# Patient Record
Sex: FEMALE | Race: WHITE | Employment: OTHER | URBAN - NONMETROPOLITAN AREA
[De-identification: names, ages, dates, MRNs, and addresses within clinical notes are randomized per-mention and may not be internally consistent; named-entity substitution may affect disease eponyms.]

---

## 2018-09-04 PROBLEM — N39.0 UTI (URINARY TRACT INFECTION): Status: ACTIVE | Noted: 2018-09-04

## 2018-09-05 ENCOUNTER — HOSPITAL ENCOUNTER (INPATIENT)
Age: 73
LOS: 5 days | Discharge: HOME HEALTH | DRG: 758 | End: 2018-09-11
Attending: HOSPITALIST | Admitting: HOSPITALIST
Payer: MEDICARE

## 2018-09-05 PROBLEM — Z85.038 HISTORY OF COLON CANCER: Status: ACTIVE | Noted: 2018-09-05

## 2018-09-05 PROBLEM — E78.49 OTHER HYPERLIPIDEMIA: Status: ACTIVE | Noted: 2018-09-05

## 2018-09-05 PROBLEM — E03.9 ACQUIRED HYPOTHYROIDISM: Status: ACTIVE | Noted: 2018-09-05

## 2018-09-05 LAB
ALBUMIN SERPL-MCNC: 3.4 G/DL (ref 3.5–5.1)
ALP BLD-CCNC: 97 U/L (ref 38–126)
ALT SERPL-CCNC: 9 U/L (ref 11–66)
ANION GAP SERPL CALCULATED.3IONS-SCNC: 16 MEQ/L (ref 8–16)
AST SERPL-CCNC: 10 U/L (ref 5–40)
BILIRUB SERPL-MCNC: < 0.2 MG/DL (ref 0.3–1.2)
BUN BLDV-MCNC: 19 MG/DL (ref 7–22)
CALCIUM SERPL-MCNC: 9.1 MG/DL (ref 8.5–10.5)
CHLORIDE BLD-SCNC: 104 MEQ/L (ref 98–111)
CO2: 23 MEQ/L (ref 23–33)
CREAT SERPL-MCNC: 0.7 MG/DL (ref 0.4–1.2)
ERYTHROCYTE [DISTWIDTH] IN BLOOD BY AUTOMATED COUNT: 13.8 % (ref 11.5–14.5)
ERYTHROCYTE [DISTWIDTH] IN BLOOD BY AUTOMATED COUNT: 41.8 FL (ref 35–45)
GFR SERPL CREATININE-BSD FRML MDRD: 82 ML/MIN/1.73M2
GLUCOSE BLD-MCNC: 101 MG/DL (ref 70–108)
HCT VFR BLD CALC: 26.1 % (ref 37–47)
HEMOGLOBIN: 8.1 GM/DL (ref 12–16)
LACTIC ACID: 0.9 MMOL/L (ref 0.5–2.2)
MCH RBC QN AUTO: 26 PG (ref 26–33)
MCHC RBC AUTO-ENTMCNC: 31 GM/DL (ref 32.2–35.5)
MCV RBC AUTO: 83.7 FL (ref 81–99)
PLATELET # BLD: 361 THOU/MM3 (ref 130–400)
PMV BLD AUTO: 10.4 FL (ref 9.4–12.4)
POTASSIUM REFLEX MAGNESIUM: 3.6 MEQ/L (ref 3.5–5.2)
RBC # BLD: 3.12 MILL/MM3 (ref 4.2–5.4)
SODIUM BLD-SCNC: 143 MEQ/L (ref 135–145)
TOTAL PROTEIN: 6.6 G/DL (ref 6.1–8)
WBC # BLD: 10.7 THOU/MM3 (ref 4.8–10.8)

## 2018-09-05 PROCEDURE — 6370000000 HC RX 637 (ALT 250 FOR IP): Performed by: INTERNAL MEDICINE

## 2018-09-05 PROCEDURE — 85027 COMPLETE CBC AUTOMATED: CPT

## 2018-09-05 PROCEDURE — 99222 1ST HOSP IP/OBS MODERATE 55: CPT | Performed by: HOSPITALIST

## 2018-09-05 PROCEDURE — 36415 COLL VENOUS BLD VENIPUNCTURE: CPT

## 2018-09-05 PROCEDURE — 96372 THER/PROPH/DIAG INJ SC/IM: CPT

## 2018-09-05 PROCEDURE — 80053 COMPREHEN METABOLIC PANEL: CPT

## 2018-09-05 PROCEDURE — 96365 THER/PROPH/DIAG IV INF INIT: CPT

## 2018-09-05 PROCEDURE — 99222 1ST HOSP IP/OBS MODERATE 55: CPT | Performed by: SURGERY

## 2018-09-05 PROCEDURE — 6360000002 HC RX W HCPCS: Performed by: INTERNAL MEDICINE

## 2018-09-05 PROCEDURE — 2709999900 HC NON-CHARGEABLE SUPPLY

## 2018-09-05 PROCEDURE — 6370000000 HC RX 637 (ALT 250 FOR IP): Performed by: HOSPITALIST

## 2018-09-05 PROCEDURE — 2580000003 HC RX 258: Performed by: INTERNAL MEDICINE

## 2018-09-05 PROCEDURE — 36592 COLLECT BLOOD FROM PICC: CPT

## 2018-09-05 PROCEDURE — G0378 HOSPITAL OBSERVATION PER HR: HCPCS

## 2018-09-05 PROCEDURE — 2580000003 HC RX 258: Performed by: HOSPITALIST

## 2018-09-05 PROCEDURE — 94760 N-INVAS EAR/PLS OXIMETRY 1: CPT

## 2018-09-05 PROCEDURE — 6360000002 HC RX W HCPCS: Performed by: HOSPITALIST

## 2018-09-05 PROCEDURE — 83605 ASSAY OF LACTIC ACID: CPT

## 2018-09-05 PROCEDURE — A6250 SKIN SEAL PROTECT MOISTURIZR: HCPCS

## 2018-09-05 RX ORDER — TRAZODONE HYDROCHLORIDE 50 MG/1
50 TABLET ORAL NIGHTLY
COMMUNITY

## 2018-09-05 RX ORDER — POTASSIUM CHLORIDE 7.45 MG/ML
10 INJECTION INTRAVENOUS PRN
Status: DISCONTINUED | OUTPATIENT
Start: 2018-09-05 | End: 2018-09-11 | Stop reason: HOSPADM

## 2018-09-05 RX ORDER — POTASSIUM CHLORIDE 20 MEQ/1
40 TABLET, EXTENDED RELEASE ORAL PRN
Status: DISCONTINUED | OUTPATIENT
Start: 2018-09-05 | End: 2018-09-11 | Stop reason: HOSPADM

## 2018-09-05 RX ORDER — FLUOXETINE HYDROCHLORIDE 20 MG/1
20 CAPSULE ORAL DAILY
Status: DISCONTINUED | OUTPATIENT
Start: 2018-09-05 | End: 2018-09-11 | Stop reason: HOSPADM

## 2018-09-05 RX ORDER — SODIUM CHLORIDE 0.9 % (FLUSH) 0.9 %
10 SYRINGE (ML) INJECTION PRN
Status: DISCONTINUED | OUTPATIENT
Start: 2018-09-05 | End: 2018-09-09 | Stop reason: SDUPTHER

## 2018-09-05 RX ORDER — ATENOLOL 25 MG/1
25 TABLET ORAL DAILY
COMMUNITY
End: 2019-03-29

## 2018-09-05 RX ORDER — SIMVASTATIN 40 MG
40 TABLET ORAL NIGHTLY
Status: DISCONTINUED | OUTPATIENT
Start: 2018-09-05 | End: 2018-09-11 | Stop reason: HOSPADM

## 2018-09-05 RX ORDER — LEVOTHYROXINE SODIUM 0.05 MG/1
75 TABLET ORAL DAILY
Status: ON HOLD | COMMUNITY
End: 2018-09-05

## 2018-09-05 RX ORDER — SODIUM CHLORIDE 0.9 % (FLUSH) 0.9 %
10 SYRINGE (ML) INJECTION EVERY 12 HOURS SCHEDULED
Status: DISCONTINUED | OUTPATIENT
Start: 2018-09-05 | End: 2018-09-09 | Stop reason: SDUPTHER

## 2018-09-05 RX ORDER — TRAZODONE HYDROCHLORIDE 50 MG/1
50 TABLET ORAL NIGHTLY PRN
Status: DISCONTINUED | OUTPATIENT
Start: 2018-09-05 | End: 2018-09-11 | Stop reason: HOSPADM

## 2018-09-05 RX ORDER — LEVOTHYROXINE SODIUM 0.07 MG/1
75 TABLET ORAL DAILY
COMMUNITY

## 2018-09-05 RX ORDER — LEVOTHYROXINE SODIUM 0.05 MG/1
50 TABLET ORAL DAILY
Status: DISCONTINUED | OUTPATIENT
Start: 2018-09-05 | End: 2018-09-05

## 2018-09-05 RX ORDER — LEVOTHYROXINE SODIUM 0.07 MG/1
75 TABLET ORAL DAILY
Status: DISCONTINUED | OUTPATIENT
Start: 2018-09-06 | End: 2018-09-11 | Stop reason: HOSPADM

## 2018-09-05 RX ORDER — FLUOXETINE 10 MG/1
20 TABLET, FILM COATED ORAL DAILY
Status: ON HOLD | COMMUNITY
End: 2018-09-05

## 2018-09-05 RX ORDER — HEPARIN SODIUM 5000 [USP'U]/ML
5000 INJECTION, SOLUTION INTRAVENOUS; SUBCUTANEOUS EVERY 8 HOURS SCHEDULED
Status: DISCONTINUED | OUTPATIENT
Start: 2018-09-05 | End: 2018-09-11 | Stop reason: HOSPADM

## 2018-09-05 RX ORDER — TRAZODONE HYDROCHLORIDE 50 MG/1
50 TABLET ORAL NIGHTLY PRN
Status: ON HOLD | COMMUNITY
End: 2018-09-05

## 2018-09-05 RX ORDER — ONDANSETRON 2 MG/ML
4 INJECTION INTRAMUSCULAR; INTRAVENOUS EVERY 6 HOURS PRN
Status: DISCONTINUED | OUTPATIENT
Start: 2018-09-05 | End: 2018-09-10

## 2018-09-05 RX ORDER — ACETAMINOPHEN 325 MG/1
650 TABLET ORAL EVERY 4 HOURS PRN
Status: DISCONTINUED | OUTPATIENT
Start: 2018-09-05 | End: 2018-09-11 | Stop reason: HOSPADM

## 2018-09-05 RX ORDER — FLUOXETINE HYDROCHLORIDE 20 MG/1
20 CAPSULE ORAL DAILY
COMMUNITY

## 2018-09-05 RX ORDER — FLUOXETINE 10 MG/1
10 CAPSULE ORAL DAILY
Status: DISCONTINUED | OUTPATIENT
Start: 2018-09-05 | End: 2018-09-05

## 2018-09-05 RX ORDER — DONEPEZIL HYDROCHLORIDE 5 MG/1
5 TABLET, FILM COATED ORAL NIGHTLY
Status: DISCONTINUED | OUTPATIENT
Start: 2018-09-05 | End: 2018-09-11 | Stop reason: HOSPADM

## 2018-09-05 RX ORDER — POTASSIUM CHLORIDE 20MEQ/15ML
40 LIQUID (ML) ORAL PRN
Status: DISCONTINUED | OUTPATIENT
Start: 2018-09-05 | End: 2018-09-11 | Stop reason: HOSPADM

## 2018-09-05 RX ORDER — VENLAFAXINE 100 MG/1
100 TABLET ORAL 2 TIMES DAILY
COMMUNITY

## 2018-09-05 RX ORDER — VENLAFAXINE 50 MG/1
100 TABLET ORAL 2 TIMES DAILY
Status: DISCONTINUED | OUTPATIENT
Start: 2018-09-05 | End: 2018-09-11 | Stop reason: HOSPADM

## 2018-09-05 RX ORDER — SODIUM CHLORIDE 9 MG/ML
INJECTION, SOLUTION INTRAVENOUS CONTINUOUS
Status: DISCONTINUED | OUTPATIENT
Start: 2018-09-05 | End: 2018-09-09

## 2018-09-05 RX ORDER — DONEPEZIL HYDROCHLORIDE 5 MG/1
5 TABLET, FILM COATED ORAL NIGHTLY
COMMUNITY

## 2018-09-05 RX ORDER — SIMVASTATIN 40 MG
40 TABLET ORAL NIGHTLY
COMMUNITY

## 2018-09-05 RX ADMIN — Medication 10 ML: at 12:02

## 2018-09-05 RX ADMIN — Medication 40 MG: at 20:40

## 2018-09-05 RX ADMIN — SODIUM CHLORIDE: 9 INJECTION, SOLUTION INTRAVENOUS at 06:44

## 2018-09-05 RX ADMIN — FLUOXETINE HYDROCHLORIDE 20 MG: 20 CAPSULE ORAL at 16:04

## 2018-09-05 RX ADMIN — SODIUM CHLORIDE: 9 INJECTION, SOLUTION INTRAVENOUS at 20:40

## 2018-09-05 RX ADMIN — VENLAFAXINE HYDROCHLORIDE 100 MG: 50 TABLET ORAL at 20:41

## 2018-09-05 RX ADMIN — VENLAFAXINE HYDROCHLORIDE 100 MG: 50 TABLET ORAL at 12:01

## 2018-09-05 RX ADMIN — VANCOMYCIN HYDROCHLORIDE 1000 MG: 1 INJECTION, POWDER, LYOPHILIZED, FOR SOLUTION INTRAVENOUS at 23:46

## 2018-09-05 RX ADMIN — HEPARIN SODIUM 5000 UNITS: 5000 INJECTION INTRAVENOUS; SUBCUTANEOUS at 16:04

## 2018-09-05 RX ADMIN — HEPARIN SODIUM 5000 UNITS: 5000 INJECTION INTRAVENOUS; SUBCUTANEOUS at 22:27

## 2018-09-05 RX ADMIN — ACETAMINOPHEN 650 MG: 325 TABLET ORAL at 20:42

## 2018-09-05 RX ADMIN — HEPARIN SODIUM 5000 UNITS: 5000 INJECTION INTRAVENOUS; SUBCUTANEOUS at 09:47

## 2018-09-05 RX ADMIN — TRAZODONE HYDROCHLORIDE 50 MG: 50 TABLET ORAL at 20:42

## 2018-09-05 RX ADMIN — DONEPEZIL HYDROCHLORIDE 5 MG: 5 TABLET, FILM COATED ORAL at 20:40

## 2018-09-05 RX ADMIN — LEVOTHYROXINE SODIUM 50 MCG: 50 TABLET ORAL at 06:58

## 2018-09-05 ASSESSMENT — PAIN DESCRIPTION - PROGRESSION
CLINICAL_PROGRESSION: NOT CHANGED
CLINICAL_PROGRESSION: NOT CHANGED

## 2018-09-05 ASSESSMENT — PAIN SCALES - GENERAL
PAINLEVEL_OUTOF10: 3
PAINLEVEL_OUTOF10: 3
PAINLEVEL_OUTOF10: 2
PAINLEVEL_OUTOF10: 0

## 2018-09-05 ASSESSMENT — PAIN DESCRIPTION - FREQUENCY
FREQUENCY: CONTINUOUS
FREQUENCY: CONTINUOUS

## 2018-09-05 ASSESSMENT — PAIN DESCRIPTION - DESCRIPTORS
DESCRIPTORS: ACHING
DESCRIPTORS: ACHING

## 2018-09-05 ASSESSMENT — PAIN DESCRIPTION - PAIN TYPE
TYPE: CHRONIC PAIN
TYPE: ACUTE PAIN

## 2018-09-05 ASSESSMENT — PAIN DESCRIPTION - LOCATION
LOCATION: BACK;ABDOMEN
LOCATION: ABDOMEN

## 2018-09-05 ASSESSMENT — PAIN DESCRIPTION - ORIENTATION
ORIENTATION: MID
ORIENTATION: MID

## 2018-09-05 ASSESSMENT — PAIN DESCRIPTION - ONSET
ONSET: ON-GOING
ONSET: ON-GOING

## 2018-09-05 NOTE — PROGRESS NOTES
Received fax from [facility] regarding blood culture from PICC line. Results are showing gram positive cocci. Results messaged to Dr Álvaro Guerrero.

## 2018-09-05 NOTE — CARE COORDINATION
DISCHARGE BARRIERS  9/5/18, 1:16 PM    Reason for Referral: \"Current with [address]"  Mental Status: Patient is alert and oriented  Decision Making: Patient makes own decisions  Family/Social/Home Environment: Spoke with patient and spouse, assessment completed. Patient lives with spouse, is fairly independent spouse assists as needed. Patient has been giving self sponge baths. Patient is current with [facility], nursing for wound and ostomy care, pic line and blood work. Ptient want s to continue with [address]. Current Services:  [facility] - see above  Current Equipment:3 pronged cane and 4 wheeled walker with seat - uses as needed  Payment Source:Medicare and [employer]  Concerns or Barriers to Discharge: none known  Collabrative List of ECF/HH were provided:current with [facility], [facility]    Teach Back Method used with patient and spouse regarding care plan and discharge planning. Patient and spouse verbalize understanding of the plan of care and contribute to goal setting. Anticipated Needs/Discharge Plan: Patient plans to return home with spouse and [facility], nursing. Spoke with Clara at [facility], informed patient is observation status, will notify when discharged.      Electronically signed by WANDA Singh on 9/5/2018 at 1:16 PM

## 2018-09-05 NOTE — CARE COORDINATION
9/5/18, 7:34 AM      Alex Guardian       Admitted from: Direct Admit from [facility] 9/5/2018/ [address] [place] [address] Center day: 0   Location: [address] [place] [address]-A Reason for admit: UTI (urinary tract infection) [N39.0] Status: Obs  Admit order signed?: yes  PMH:  has a past medical history of Cancer ([address] Hyperlipidemia; and Thyroid disease. Procedure: No  Pertinent abnormal Imaging:No  Medications:  Scheduled Meds:   donepezil  5 mg Oral Nightly    FLUoxetine  10 mg Oral Daily    levothyroxine  50 mcg Oral Daily    simvastatin  40 mg Oral Nightly    venlafaxine  100 mg Oral BID    sodium chloride flush  10 mL Intravenous 2 times per day    heparin (porcine)  5,000 Units Subcutaneous 3 times per day     Continuous Infusions:   sodium chloride 75 mL/hr at 09/05/18 0644      Pertinent Info/Orders/Treatment Plan:  VS. I&O. IVF. Follow labs. Diet: DIET GENERAL; No Added Salt (3-4 GM)   DVT Prophylaxis: Not presently. Smoking status:  has no tobacco history on file. Influenza Vaccination Screening Completed: n/a  Pneumonia Vaccination Screening Completed: yes  PCP: Brian Velez MD  Readmission: No  Readmission Risk Score: 4%    Discharge Planning  Current Residence:  Private Residence  Living Arrangements:  Spouse/Significant Other   Support Systems:  Spouse/Significant Other  Current Services PTA:     Potential Assistance Needed:  Home Care  Potential Assistance Purchasing Medications:  No  Does patient want to participate in local refill/ meds to beds program?  No  Type of Home Care Services:  None  Patient expects to be discharged to:  home  Expected Discharge date: Follow Up Appointment: Best Day/ Time: Wednesday PM    Discharge Plan: Met with pt today. Very pleasant and cooperative. From home with spouse and current with [facility]. She uses a rolling/seated walker. She has TPN at night for additional nutrients and healing benefits. She has a PCP, transportation and no issues getting meds.  She is advised

## 2018-09-05 NOTE — PROGRESS NOTES
Consulted to see patient for fistula wound on the abdomen. Patient resting in bed with  at bedside. Patient has on a leaking Leroy wound system pouch. Pouch removed and area cleansed with warm wet wash cloth and dried. Moderate amount of brownish fluid noted on skin and on the chux. Mid section of the abdomen noted to have a small open area that is actively draining brownish tan purulent fluid. 2 blue sutures noted to be coming out of the area. Patient has had this chronic fistula for many months. Dr. Julisa Cates in and viewed the area. Patient has had extensive surgery and treatment for cancer at a hospital in [place]. They have been seeing her for this fistula. Dr. Julisa Cates consulting surgeon. Some redness noted of the periskin. Stoma powder applied to the periskin and sealed in with skin prep x2. Flange on an Lizz pouch cut to slightly larger than fistula opening. New hope cement applied to back of the flange near the opening. Strip paste applied to the creases in the miller wound and cement over top. After the cement became tacky, lizz pouch applied to patient and hooked to the gray drain bag. Supplies to bedside if additional pouch change needed. Will cont to monitor. Care plan reviewed with patient and RN. Patient and RN verbalize understanding of the plan of care and contribute to goal setting.           2x1 fistula opening on abdomen

## 2018-09-05 NOTE — CONSULTS
[address] [address]  General Surgery Consult Note  Ashlyn Tejeda MD     Pt Name: Avelino Chavez  MRN: [id number]  YOB: 1945  Date of evaluation: 9/5/2018  Primary Care Physician: Silvana Allan MD  Patient evaluated at the request of  Dr. Helga Dunbar  Reason for evaluation: \"fistula\"  IMPRESSIONS:   1. Colocutaneous fistula- chronic  2. UTI  3. Remote history of appendiceal carcinoma  4. Bacteremia presumed secondary to PICC line. PLANS:   1. Patient's colocutaneous fistula involving the transverse colon is adequately drained. Per he cutaneous fluid collection is stable per conversations with [facility]   2. Patient patient's  states they're following closely with Manju Hernandez for treatment of this colocutaneous fistula  3. Leave the visible suture material alone  4. Call surgery as needed. Patient needs to follow with her long-standing physicians at [facility]  5. Anemia presumably chronic. Patient did have an EGD and colonoscopy earlier this she air in [place]. 6. Antibiotic therapy per infectious disease. PICC line removed. SUBJECTIVE:   Chief complaint: Fever or abdominal pain    History of present illness:   Patient is a very pleasant 75-year-old female who was transferred to [address] [place] [address] from [facility] when they did not want to admit her at their institution. She had seen her primary care physician with whom she follows closely for possible UTI and sepsis. They did not admit her from [place] because of a chronic fistula on her abdomen. She's had this for many years. She was treated for appendiceal carcinoma the [facility] in 2012. She had intraperitoneal chemotherapy. Subsequent to that she developed a fistula at the area of her anastomosis reportedly on the right side of her abdomen she had it for many years it was very low output and wishes managed with local wound care.   Over the last year so it was increasing in size Hematological: Does not bruise/bleed easily. Psychiatric/Behavioral: Negative for behavioral problems, confusion and suicidal ideas. OBJECTIVE:   CURRENT VITALS:  height is 5' 4\" (1.626 m) and weight is 127 lb (57.6 kg). Her oral temperature is 98.1 °F (36.7 °C). Her blood pressure is 119/58 (abnormal) and her pulse is 92. Her respiration is 16 and oxygen saturation is 100%. Temperature Range (24h):Temp: 98.1 °F (36.7 °C) Temp  Av.3 °F (36.8 °C)  Min: 98.1 °F (36.7 °C)  Max: 98.4 °F (36.9 °C)  BP Range (24P): Systolic (84EMT), DPH:375 , Min:105 , NEB:529     Diastolic (69JVY), XXT:78, Min:58, Max:82    Pulse Range (24h): Pulse  Av.3  Min: 86  Max: 92  Respiration Range (24h): Resp  Av.5  Min: 16  Max: 18  Current Pulse Ox (24h):  SpO2: 100 %  Pulse Ox Range (24h):  SpO2  Av.8 %  Min: 96 %  Max: 100 %  Oxygen Amount and Delivery:    Physical Exam   Constitutional: She is oriented to person, place, and time. She appears well-developed and well-nourished. HENT:   Head: Normocephalic and atraumatic. Mouth/Throat: Oropharynx is clear and moist.   Eyes: Pupils are equal, round, and reactive to light. EOM are normal.   Neck: Normal range of motion. Neck supple. No JVD present. No tracheal deviation present. No thyromegaly present. Cardiovascular: Normal rate and normal heart sounds. Pulmonary/Chest: Breath sounds normal. No respiratory distress. She has no wheezes. Abdominal: Soft. Bowel sounds are normal. She exhibits no distension. There is tenderness. There is no rebound and no guarding. Musculoskeletal: Normal range of motion. She exhibits no edema. Lymphadenopathy:     She has no cervical adenopathy. Neurological: She is alert and oriented to person, place, and time. No cranial nerve deficit. Skin: Skin is warm and dry. No rash noted. Psychiatric: She has a normal mood and affect. Vitals reviewed.         LABS:     Recent Labs      18   0612   WBC  10.7

## 2018-09-06 LAB
BACTERIA: ABNORMAL /HPF
BILIRUBIN URINE: NEGATIVE
BLOOD, URINE: ABNORMAL
CASTS 2: ABNORMAL /LPF
CASTS UA: ABNORMAL /LPF
CHARACTER, URINE: CLEAR
COLOR: YELLOW
CRYSTALS, UA: ABNORMAL
EPITHELIAL CELLS, UA: ABNORMAL /HPF
GLUCOSE URINE: NEGATIVE MG/DL
KETONES, URINE: NEGATIVE
LEUKOCYTE ESTERASE, URINE: ABNORMAL
MISCELLANEOUS 2: ABNORMAL
NITRITE, URINE: NEGATIVE
PH UA: 6.5
PROTEIN UA: NEGATIVE
RBC URINE: ABNORMAL /HPF
RENAL EPITHELIAL, UA: ABNORMAL
SPECIFIC GRAVITY, URINE: 1.01 (ref 1–1.03)
UROBILINOGEN, URINE: 0.2 EU/DL
WBC UA: ABNORMAL /HPF
YEAST: ABNORMAL

## 2018-09-06 PROCEDURE — 6370000000 HC RX 637 (ALT 250 FOR IP): Performed by: INTERNAL MEDICINE

## 2018-09-06 PROCEDURE — 2580000003 HC RX 258: Performed by: HOSPITALIST

## 2018-09-06 PROCEDURE — 1200000000 HC SEMI PRIVATE

## 2018-09-06 PROCEDURE — 2580000003 HC RX 258: Performed by: INTERNAL MEDICINE

## 2018-09-06 PROCEDURE — 6360000002 HC RX W HCPCS: Performed by: INTERNAL MEDICINE

## 2018-09-06 PROCEDURE — 87070 CULTURE OTHR SPECIMN AEROBIC: CPT

## 2018-09-06 PROCEDURE — 81001 URINALYSIS AUTO W/SCOPE: CPT

## 2018-09-06 PROCEDURE — 02PYX3Z REMOVAL OF INFUSION DEVICE FROM GREAT VESSEL, EXTERNAL APPROACH: ICD-10-PCS | Performed by: HOSPITALIST

## 2018-09-06 PROCEDURE — 6360000002 HC RX W HCPCS: Performed by: HOSPITALIST

## 2018-09-06 PROCEDURE — 96375 TX/PRO/DX INJ NEW DRUG ADDON: CPT

## 2018-09-06 PROCEDURE — 87205 SMEAR GRAM STAIN: CPT

## 2018-09-06 PROCEDURE — 99233 SBSQ HOSP IP/OBS HIGH 50: CPT | Performed by: INTERNAL MEDICINE

## 2018-09-06 PROCEDURE — 96372 THER/PROPH/DIAG INJ SC/IM: CPT

## 2018-09-06 PROCEDURE — 87086 URINE CULTURE/COLONY COUNT: CPT

## 2018-09-06 PROCEDURE — 2709999900 HC NON-CHARGEABLE SUPPLY

## 2018-09-06 RX ADMIN — HEPARIN SODIUM 5000 UNITS: 5000 INJECTION INTRAVENOUS; SUBCUTANEOUS at 06:29

## 2018-09-06 RX ADMIN — Medication 10 ML: at 20:00

## 2018-09-06 RX ADMIN — Medication 10 ML: at 08:33

## 2018-09-06 RX ADMIN — CEFEPIME HYDROCHLORIDE 2 G: 2 INJECTION, POWDER, FOR SOLUTION INTRAVENOUS at 00:57

## 2018-09-06 RX ADMIN — Medication 40 MG: at 19:59

## 2018-09-06 RX ADMIN — SODIUM CHLORIDE: 9 INJECTION, SOLUTION INTRAVENOUS at 12:07

## 2018-09-06 RX ADMIN — CEFEPIME HYDROCHLORIDE 2 G: 2 INJECTION, POWDER, FOR SOLUTION INTRAVENOUS at 13:17

## 2018-09-06 RX ADMIN — FLUOXETINE HYDROCHLORIDE 20 MG: 20 CAPSULE ORAL at 08:31

## 2018-09-06 RX ADMIN — ACETAMINOPHEN 650 MG: 325 TABLET ORAL at 18:51

## 2018-09-06 RX ADMIN — LEVOTHYROXINE SODIUM 75 MCG: 0.07 TABLET ORAL at 06:33

## 2018-09-06 RX ADMIN — DONEPEZIL HYDROCHLORIDE 5 MG: 5 TABLET, FILM COATED ORAL at 19:59

## 2018-09-06 RX ADMIN — ACETAMINOPHEN 650 MG: 325 TABLET ORAL at 11:40

## 2018-09-06 RX ADMIN — HEPARIN SODIUM 5000 UNITS: 5000 INJECTION INTRAVENOUS; SUBCUTANEOUS at 13:07

## 2018-09-06 RX ADMIN — VENLAFAXINE HYDROCHLORIDE 100 MG: 50 TABLET ORAL at 06:33

## 2018-09-06 RX ADMIN — HEPARIN SODIUM 5000 UNITS: 5000 INJECTION INTRAVENOUS; SUBCUTANEOUS at 22:09

## 2018-09-06 RX ADMIN — VENLAFAXINE HYDROCHLORIDE 100 MG: 50 TABLET ORAL at 19:58

## 2018-09-06 ASSESSMENT — ENCOUNTER SYMPTOMS
NAUSEA: 0
TROUBLE SWALLOWING: 0
COLOR CHANGE: 0
VOMITING: 0
SORE THROAT: 0
BLOOD IN STOOL: 0
ABDOMINAL PAIN: 1
WHEEZING: 0
SHORTNESS OF BREATH: 0
COUGH: 0
VOICE CHANGE: 0

## 2018-09-06 ASSESSMENT — PAIN SCALES - GENERAL
PAINLEVEL_OUTOF10: 0
PAINLEVEL_OUTOF10: 6
PAINLEVEL_OUTOF10: 0
PAINLEVEL_OUTOF10: 6
PAINLEVEL_OUTOF10: 4

## 2018-09-06 NOTE — PROGRESS NOTES
INFECTIOUS DISEASES  PROGRESS NOTE    Pt Name: Adriana Cisneros  MRN: [id number]  [id number]  YOB: 1945  Admit Date: 9/5/2018  4:15 AM  Date of evaluation: 9/6/2018  Primary Care Physician: Daniela Portillo MD   [id number]      15-year-old female who it  appears has had history of problems with appendiceal cancer as per the  nursing staff informed me, ended up having a surgery done in [place],  but has had abdominal issues with nonhealing abdominal wound with what  appears to be fistula that has been present and has been noted to have an  ostomy pouch that is being applied to this area. The patient has had some  abdominal area that has sutures noted coming out of the midline and  essentially she it appears has had chronic PICC line in place and I am not  sure if TPN was being used to be given through this area. The patient  presented to the [facility] ER because of clinically not looking well  and being suspected to have UTI and she ended up being admitted to [address] [place] [address] because of her abdominal wound and complicated clinical condition  that warranted further evaluations.     The patient denies any dysuria and essentially she is not able to give much  history given her problems with dementia. Subjective: Interval History: As above. Notes reviewed. Feeing better and picc line removed.   Gram positive cocci in the blood from [facility]    Active ATBs: vancomycin                        Cefepime    Pt/Chart review:  Fever No, DiarrheaNo, Dyspnea No, Nausea:None      Data:   Scheduled Meds:   donepezil  5 mg Oral Nightly    simvastatin  40 mg Oral Nightly    venlafaxine  100 mg Oral BID    sodium chloride flush  10 mL Intravenous 2 times per day    heparin (porcine)  5,000 Units Subcutaneous 3 times per day    FLUoxetine  20 mg Oral Daily    levothyroxine  75 mcg Oral Daily    cefepime  2 g Intravenous Q12H    vancomycin (VANCOCIN) intermittent dosing (placeholder)   Other RX Placeholder    vancomycin  1,000 mg Intravenous Q24H     Continuous Infusions:   sodium chloride Stopped (09/06/18 1208)       I & O's:  I/O last 3 completed shifts: In: 3096 [P.O.:1220; I.V.:1876]  Out: 7942 [Urine:1900; Other:375]  I/O this shift:  In: -   Out: 525 [Urine:400; Other:125]    Intake/Output Summary (Last 24 hours) at 09/06/18 1711  Last data filed at 09/06/18 1528   Gross per 24 hour   Intake             3096 ml   Output             2800 ml   Net              296 ml       Date 09/06/18 0000 - 09/06/18 2359   Shift 5999-9796 5709-0449 2033-6971 24 Hour Total   I  N  T  A  K  E   P.O.  (mL/kg/hr)  740  (1.6)  740    I.V.  (mL/kg) 741  (12.9) 627  (10.9)  1368  (23.7)    Shift Total  (mL/kg) 741  (12.9) 1367  (23.7)  2108  (36.6)   O  U  T  P  U  T   Urine  (mL/kg/hr) 1200  (2.6) 800  (1.7)  2000    Other  (mL/kg) 50  (0.9) 275  (4.8)  325  (5.6)    Shift Total  (mL/kg) 1250  (21.7) 1075  (18.7)  2325  (40.4)   Weight (kg) 57.6 57.6 57.6 57.6           CBC:   Recent Labs      09/05/18 0612   WBC  10.7   HGB  8.1*   PLT  361     BMP:  Recent Labs      09/05/18   0612   NA  143   K  3.6   CL  104   CO2  23   BUN  19   CREATININE  0.7   GLUCOSE  101     Hepatic: Recent Labs      09/05/18 0612   AST  10   ALT  9*   BILITOT  <0.2*   ALKPHOS  97     BNP: No results for input(s): BNP in the last 72 hours. URINALYSIS:   Results for Klever Barron (MRN [id number]) as of 9/6/2018 21:38   Ref.  Range 9/6/2018 01:02   Color, UA Latest Ref Range: STRAW-YELL  YELLOW   Glucose, UA Latest Ref Range: NEGATIVE mg/dl NEGATIVE   Bilirubin, Urine Latest Ref Range: NEGATIVE  NEGATIVE   Ketones, Urine Latest Ref Range: NEGATIVE  NEGATIVE   Blood, Urine Latest Ref Range: NEGATIVE  LARGE (A)   pH, UA Latest Ref Range: 5.0 - 9.0  6.5   Protein, UA Latest Ref Range: NEGATIVE  NEGATIVE   Urobilinogen, Urine Latest Ref Range: 0.0 - 1.0 eu/dl 0.2   Nitrite, Urine Latest Ref Range: NEGATIVE  NEGATIVE   Leukocyte Esterase, Urine Latest Ref D/w Patient's R.N. and specific instructions given and infectious disease issues addressed. Will follow the patient closely with you. Also please see the orders for updated patient care orders written by ID team.    Thank you for involving us in this patient's care. I will be discussing this case with the treating physicians. Please don't hesitate to call me if any questions, issues  or concerns      Please see orders for updated patient care orders written today.   Electronically signed by Balwinder Chaudhary on 9/6/2018 at 5:11 PM

## 2018-09-06 NOTE — PROGRESS NOTES
auscultation, bilaterally   Cardiovascular: Regular rate and rhythm with normal S1/S2  Abdomen: Soft, non-tender, non-distended with normal bowel sounds. Musculoskeletal: passive and active ROM x 4 extremities. Skin: Ostomy clean. Neurologic:  Grossly non-focal.  Psychiatric: Alert and oriented, thought content appropriate, normal insight  Capillary Refill: Brisk,< 3 seconds   Peripheral Pulses: +2 palpable, equal bilaterally       Labs:   Recent Labs      09/05/18 0612   WBC  10.7   HGB  8.1*   HCT  26.1*   PLT  361     Recent Labs      09/05/18 0612   NA  143   K  3.6   CL  104   CO2  23   BUN  19   CREATININE  0.7   CALCIUM  9.1     Recent Labs      09/05/18 0612   AST  10   ALT  9*   BILITOT  <0.2*   ALKPHOS  97     No results for input(s): INR in the last 72 hours. No results for input(s): Katarina Golds in the last 72 hours.     Urinalysis:    Lab Results   Component Value Date    NITRU NEGATIVE 09/06/2018    WBCUA 15-25 09/06/2018    BACTERIA NONE 09/06/2018    RBCUA 5-10 09/06/2018    BLOODU LARGE 09/06/2018    GLUCOSEU NEGATIVE 09/06/2018       Radiology:  No orders to display       Diet: DIET GENERAL; No Added Salt (3-4 GM)  Dietary Nutrition Supplements: Frozen Oral Supplement    DVT prophylaxis: [] Lovenox                                 [] SCDs                                 [x] SQ Heparin                                 [] Encourage ambulation           [] Already on Anticoagulation     Disposition:    [] Home       [] TCU       [] Rehab       [] Psych       [] SNF       [] [place]       [] Other-    Code Status: Full Code    PT/OT Eval Status:   Assessment/Plan:    Anticipated Discharge in : 2-3 days    Active Hospital Problems    Diagnosis Date Noted    History of colon cancer [Z85.038] 09/05/2018    Other hyperlipidemia [E78.4] 09/05/2018    Acquired hypothyroidism [E03.9] 09/05/2018    Colocutaneous fistula [K63.2]     UTI (urinary tract infection) [N39.0]

## 2018-09-06 NOTE — PLAN OF CARE
Problem: Pain:  Goal: Pain level will decrease  Pain level will decrease   Outcome: Ongoing  Pain in abdomen 6/10 - 2 tylenol given and it reduced pain to 4/10. Problem: DISCHARGE BARRIERS  Goal: Patient's continuum of care needs are met  Outcome: Ongoing  Care plan reviewed with patient and  Mariajose Gonzalez. Patient and  verbalize understanding of the plan of care and contribute to goal setting. Plan is to return home with  and [facility] which is was current with -- they called today and were given update. Problem: Cardiovascular  Goal: No DVT, peripheral vascular complications  Outcome: Ongoing  No sign of DVT. Heparin given TID. Problem: GI  Goal: No bowel complications  Outcome: Ongoing  No BM today. Tolerating general diet. Dietician consult today -- supplements added. Patient ate about 50% of meals. Problem:   Goal: Adequate urinary output  Outcome: Ongoing  Voiding clear yellow urine, 800 ml this shift. No dysuria. Problem: Skin Integrity/Risk  Goal: No skin breakdown during hospitalization  Outcome: Ongoing  Abdominal fistula with appliance in place. 275 ml of yellow creamy fluid drained today.

## 2018-09-06 NOTE — PROGRESS NOTES
(variable)    Nutrition Risk Level: High    Nutrition Interventions:   Continue current diet, Start ONS  Continued Inpatient Monitoring, Education Not Indicated    Nutrition Evaluation:   · Evaluation: Goals set   · Goals: Patient will consume 75% or greater of most meals and ONS during LOS     · Monitoring: Meal Intake, Supplement Intake, Diet Tolerance, Skin Integrity, Wound Healing, Ascites/Edema, Weight, Comparative Standards, Pertinent Labs, Nausea or Vomiting    See Adult Nutrition Doc Flowsheet for more detail.      Electronically signed by Armando Gamboa RD, LD on 9/6/18 at 2:44 PM    Contact Number: [phone]

## 2018-09-07 LAB
ANION GAP SERPL CALCULATED.3IONS-SCNC: 14 MEQ/L (ref 8–16)
BASOPHILS # BLD: 0.5 %
BASOPHILS ABSOLUTE: 0 THOU/MM3 (ref 0–0.1)
BUN BLDV-MCNC: 8 MG/DL (ref 7–22)
CALCIUM SERPL-MCNC: 9.3 MG/DL (ref 8.5–10.5)
CHLORIDE BLD-SCNC: 105 MEQ/L (ref 98–111)
CO2: 24 MEQ/L (ref 23–33)
CREAT SERPL-MCNC: 0.7 MG/DL (ref 0.4–1.2)
CREAT SERPL-MCNC: 0.7 MG/DL (ref 0.4–1.2)
EOSINOPHIL # BLD: 2.6 %
EOSINOPHILS ABSOLUTE: 0.2 THOU/MM3 (ref 0–0.4)
ERYTHROCYTE [DISTWIDTH] IN BLOOD BY AUTOMATED COUNT: 13.5 % (ref 11.5–14.5)
ERYTHROCYTE [DISTWIDTH] IN BLOOD BY AUTOMATED COUNT: 42.3 FL (ref 35–45)
GFR SERPL CREATININE-BSD FRML MDRD: 82 ML/MIN/1.73M2
GFR SERPL CREATININE-BSD FRML MDRD: 82 ML/MIN/1.73M2
GLUCOSE BLD-MCNC: 112 MG/DL (ref 70–108)
HCT VFR BLD CALC: 30.4 % (ref 37–47)
HEMOGLOBIN: 9.2 GM/DL (ref 12–16)
IMMATURE GRANS (ABS): 0.02 THOU/MM3 (ref 0–0.07)
IMMATURE GRANULOCYTES: 0.3 %
LYMPHOCYTES # BLD: 37.8 %
LYMPHOCYTES ABSOLUTE: 3 THOU/MM3 (ref 1–4.8)
MCH RBC QN AUTO: 25.7 PG (ref 26–33)
MCHC RBC AUTO-ENTMCNC: 30.3 GM/DL (ref 32.2–35.5)
MCV RBC AUTO: 84.9 FL (ref 81–99)
MONOCYTES # BLD: 10.9 %
MONOCYTES ABSOLUTE: 0.9 THOU/MM3 (ref 0.4–1.3)
NUCLEATED RED BLOOD CELLS: 0 /100 WBC
ORGANISM: ABNORMAL
PLATELET # BLD: 418 THOU/MM3 (ref 130–400)
PMV BLD AUTO: 10 FL (ref 9.4–12.4)
POTASSIUM SERPL-SCNC: 3.7 MEQ/L (ref 3.5–5.2)
RBC # BLD: 3.58 MILL/MM3 (ref 4.2–5.4)
SEG NEUTROPHILS: 47.9 %
SEGMENTED NEUTROPHILS ABSOLUTE COUNT: 3.8 THOU/MM3 (ref 1.8–7.7)
SODIUM BLD-SCNC: 143 MEQ/L (ref 135–145)
URINE CULTURE REFLEX: ABNORMAL
WBC # BLD: 8 THOU/MM3 (ref 4.8–10.8)

## 2018-09-07 PROCEDURE — 99233 SBSQ HOSP IP/OBS HIGH 50: CPT | Performed by: INTERNAL MEDICINE

## 2018-09-07 PROCEDURE — 82565 ASSAY OF CREATININE: CPT

## 2018-09-07 PROCEDURE — 2580000003 HC RX 258: Performed by: HOSPITALIST

## 2018-09-07 PROCEDURE — 2580000003 HC RX 258: Performed by: INTERNAL MEDICINE

## 2018-09-07 PROCEDURE — 6370000000 HC RX 637 (ALT 250 FOR IP): Performed by: INTERNAL MEDICINE

## 2018-09-07 PROCEDURE — 80048 BASIC METABOLIC PNL TOTAL CA: CPT

## 2018-09-07 PROCEDURE — 6360000002 HC RX W HCPCS: Performed by: HOSPITALIST

## 2018-09-07 PROCEDURE — 80202 ASSAY OF VANCOMYCIN: CPT

## 2018-09-07 PROCEDURE — 6360000002 HC RX W HCPCS: Performed by: INTERNAL MEDICINE

## 2018-09-07 PROCEDURE — 36415 COLL VENOUS BLD VENIPUNCTURE: CPT

## 2018-09-07 PROCEDURE — 85025 COMPLETE CBC W/AUTO DIFF WBC: CPT

## 2018-09-07 PROCEDURE — 1200000000 HC SEMI PRIVATE

## 2018-09-07 RX ADMIN — VENLAFAXINE HYDROCHLORIDE 100 MG: 50 TABLET ORAL at 08:48

## 2018-09-07 RX ADMIN — DONEPEZIL HYDROCHLORIDE 5 MG: 5 TABLET, FILM COATED ORAL at 20:26

## 2018-09-07 RX ADMIN — FLUOXETINE HYDROCHLORIDE 20 MG: 20 CAPSULE ORAL at 08:49

## 2018-09-07 RX ADMIN — ONDANSETRON 4 MG: 2 INJECTION INTRAMUSCULAR; INTRAVENOUS at 15:51

## 2018-09-07 RX ADMIN — HEPARIN SODIUM 5000 UNITS: 5000 INJECTION INTRAVENOUS; SUBCUTANEOUS at 21:33

## 2018-09-07 RX ADMIN — LEVOTHYROXINE SODIUM 75 MCG: 0.07 TABLET ORAL at 06:19

## 2018-09-07 RX ADMIN — CEFEPIME HYDROCHLORIDE 2 G: 2 INJECTION, POWDER, FOR SOLUTION INTRAVENOUS at 12:59

## 2018-09-07 RX ADMIN — SODIUM CHLORIDE: 9 INJECTION, SOLUTION INTRAVENOUS at 18:59

## 2018-09-07 RX ADMIN — SODIUM CHLORIDE: 9 INJECTION, SOLUTION INTRAVENOUS at 01:34

## 2018-09-07 RX ADMIN — HEPARIN SODIUM 5000 UNITS: 5000 INJECTION INTRAVENOUS; SUBCUTANEOUS at 12:59

## 2018-09-07 RX ADMIN — CEFEPIME HYDROCHLORIDE 2 G: 2 INJECTION, POWDER, FOR SOLUTION INTRAVENOUS at 01:32

## 2018-09-07 RX ADMIN — HEPARIN SODIUM 5000 UNITS: 5000 INJECTION INTRAVENOUS; SUBCUTANEOUS at 06:15

## 2018-09-07 RX ADMIN — ACETAMINOPHEN 650 MG: 325 TABLET ORAL at 19:02

## 2018-09-07 RX ADMIN — VENLAFAXINE HYDROCHLORIDE 100 MG: 50 TABLET ORAL at 20:27

## 2018-09-07 RX ADMIN — Medication 40 MG: at 20:26

## 2018-09-07 RX ADMIN — VANCOMYCIN HYDROCHLORIDE 1000 MG: 1 INJECTION, POWDER, LYOPHILIZED, FOR SOLUTION INTRAVENOUS at 00:00

## 2018-09-07 ASSESSMENT — PAIN SCALES - GENERAL
PAINLEVEL_OUTOF10: 2
PAINLEVEL_OUTOF10: 0
PAINLEVEL_OUTOF10: 0
PAINLEVEL_OUTOF10: 1
PAINLEVEL_OUTOF10: 0

## 2018-09-07 ASSESSMENT — PAIN DESCRIPTION - PAIN TYPE: TYPE: ACUTE PAIN

## 2018-09-07 ASSESSMENT — PAIN DESCRIPTION - LOCATION: LOCATION: BACK;ABDOMEN

## 2018-09-07 NOTE — PROGRESS NOTES
reactive to light. Conjunctivae/corneas clear. Neck: Supple, with full range of motion. Respiratory:  Normal respiratory effort. Clear to auscultation, bilaterally   Cardiovascular: Regular rate and rhythm with normal S1/S2  Abdomen: Soft, non-tender, non-distended with normal bowel sounds. Musculoskeletal: passive and active ROM x 4 extremities. Skin: Ostomy clean. Neurologic:  Grossly non-focal.  Psychiatric: Alert and oriented, thought content appropriate, normal insight  Capillary Refill: Brisk,< 3 seconds   Peripheral Pulses: +2 palpable, equal bilaterally       Labs:   Recent Labs      09/05/18 0612 09/07/18 0827   WBC  10.7  8.0   HGB  8.1*  9.2*   HCT  26.1*  30.4*   PLT  361  418*     Recent Labs      09/05/18 0612 09/07/18 0827   NA  143  143   K  3.6  3.7   CL  104  105   CO2  23  24   BUN  19  8   CREATININE  0.7  0.7  0.7   CALCIUM  9.1  9.3     Recent Labs      09/05/18 0612   AST  10   ALT  9*   BILITOT  <0.2*   ALKPHOS  97     No results for input(s): INR in the last 72 hours. No results for input(s): Lenice Blitz in the last 72 hours.     Urinalysis:      Lab Results   Component Value Date    NITRU NEGATIVE 09/06/2018    WBCUA 15-25 09/06/2018    BACTERIA NONE 09/06/2018    RBCUA 5-10 09/06/2018    BLOODU LARGE 09/06/2018    GLUCOSEU NEGATIVE 09/06/2018       Radiology:  No orders to display       Diet: DIET GENERAL; No Added Salt (3-4 GM)  Dietary Nutrition Supplements: Frozen Oral Supplement    DVT prophylaxis: [] Lovenox                                 [] SCDs                                 [x] SQ Heparin                                 [] Encourage ambulation           [] Already on Anticoagulation     Disposition:    [] Home       [] TCU       [] Rehab       [] Psych       [] SNF       [] [place]       [] Other-    Code Status: Full Code    PT/OT Eval Status:   Assessment/Plan:    Anticipated Discharge in : 2-3 days    Active Hospital Problems Diagnosis Date Noted    History of colon cancer [Z85.038] 09/05/2018    Other hyperlipidemia [E78.4] 09/05/2018    Acquired hypothyroidism [E03.9] 09/05/2018    Colocutaneous fistula [K63.2]     UTI (urinary tract infection) [N39.0] 09/04/2018   Sepsis    1. Sepsis: ID input appreciated. Picc out, continue mgt  2. Colocutaneous fistula: Wound care.         Electronically signed by Margaret Rudolph MD on 9/7/2018 at 3:33 PM

## 2018-09-07 NOTE — PROGRESS NOTES
Nutrition Assessment    Type and Reason for Visit: Reassess    Nutrition Recommendations: Continue current diet; ONS modified. Recommend MVI. Calorie count initiated. Malnutrition Assessment:  · Malnutrition Status: At risk for malnutrition    Nutrition Diagnosis:   · Problem: Increased nutrient needs  · Etiology: related to Alteration in GI function, Acute injury/trauma     Signs and symptoms:  as evidenced by Presence of wounds (colocutaneous fistula)    Nutrition Assessment:  · Subjective Assessment: Pt. Seen with spouse. Reports dislikes Magic cup ONS. Agrees to trial Ensure Clear once/day and agrees to [place] yogurt TID. Did not tolerate Ensure shakes. 2 BMs noted past 24 hours. Calorie count initiated. Notified nutrition ambassador. Pt and spouse very familiar with protein sources and attentive to nutrition. See 9/6 progress note for additional details on nutrition interventions pta (home TPN).     · Wound Type:  (colocutaneous fistula with bagging)  · Current Nutrition Therapies:  · Oral Diet Orders: No Added Salt (3-4gm)   · Oral Diet intake: 26-50%, 51-75%, %  · Oral Nutrition Supplement (ONS) Orders: Frozen Oral Supplement (Trailing Magic Cup TID (additional 27 grams protein per day if consumed))  · ONS intake:  (dislikes Magic cups; trial Ensure Clear once/day)  · Anthropometric Measures:  · Ht: 5' 4\" (162.6 cm)   · Current Body Wt: 125 lb 3.2 oz (56.8 kg) (9/7, no edema)  · Admission Body Wt: 127 lb (57.6 kg) (9/5/18, bedscale, no edema)  · Usual Body Wt:  (weight stable and only fluctuates in ounces per )  · Ideal Body Wt: 120 lb (54.4 kg),   · BMI Classification: BMI 18.5 - 24.9 Normal Weight  · Comparative Standards (Estimated Nutrition Needs):  · Estimated Daily Total Kcal: ~1625+ (~28+ kcals/kg current weight of 58 kg)  · Estimated Daily Protein (g): ~87+ (~1.5+ grams/kg current weight of 58 kg)    Estimated Intake vs Estimated Needs: Insufficient Data    Nutrition Risk Level: High    Nutrition Interventions:   Continue current diet, Modify current ONS  Continued Inpatient Monitoring, Education Initiated, Coordination of Care (9/7 Encouraged good nutrition, protein intake, ONS intake at best efforts.)    Nutrition Evaluation:   · Evaluation: Progressing toward goals   · Goals: Patient will consume 75% or greater of most meals and ONS during LOS     · Monitoring: Meal Intake, Supplement Intake, Diet Tolerance, Skin Integrity, Wound Healing, Ascites/Edema, Weight, Comparative Standards, Pertinent Labs, Nausea or Vomiting    See Adult Nutrition Doc Flowsheet for more detail.      Electronically signed by Doreen Yee RD, DEVONTE on 9/7/18 at 3:55 PM    Contact Number: [phone]

## 2018-09-07 NOTE — FLOWSHEET NOTE
09/07/18 1646   Provider Notification   Reason for Communication Review case   Provider Name [facility]   Provider Notification Physician   Method of Communication Secure Message   Response Waiting for response   Notification Time 21      when you come back to [facility], [facility] faxed me the urine final culture result being klebsiella -- I have in the front of the chart if you'd like to review

## 2018-09-07 NOTE — PROGRESS NOTES
INFECTIOUS DISEASES  PROGRESS NOTE    Pt Name: Gaviota Wright  MRN: [id number]  [id number]  YOB: 1945  Admit Date: 9/5/2018  4:15 AM  Date of evaluation: 9/7/2018  Primary Care Physician: Santos Contreras MD   [id number]/056-[id number]      57-year-old female who it  appears has had history of problems with appendiceal cancer as per the  nursing staff informed me, ended up having a surgery done in [place],  but has had abdominal issues with nonhealing abdominal wound with what  appears to be fistula that has been present and has been noted to have an  ostomy pouch that is being applied to this area. The patient has had some  abdominal area that has sutures noted coming out of the midline and  essentially she it appears has had chronic PICC line in place and I am not  sure if TPN was being used to be given through this area. The patient  presented to the [facility] ER because of clinically not looking well  and being suspected to have UTI and she ended up being admitted to [address] [place] [address] because of her abdominal wound and complicated clinical condition  that warranted further evaluations.     The patient denies any dysuria and essentially she is not able to give much  history given her problems with dementia. Subjective: Interval History: As above. Notes reviewed. Feeing better and picc line removed. Gram positive cocci in the blood from [facility]. ...  Awaiting results     Active ATBs: vancomycin 9/6                        Cefepime 9/6    Pt/Chart review:  Fever No, DiarrheaNo, Dyspnea No, Nausea:None      Data:   Scheduled Meds:   donepezil  5 mg Oral Nightly    simvastatin  40 mg Oral Nightly    venlafaxine  100 mg Oral BID    sodium chloride flush  10 mL Intravenous 2 times per day    heparin (porcine)  5,000 Units Subcutaneous 3 times per day    FLUoxetine  20 mg Oral Daily    levothyroxine  75 mcg Oral Daily    cefepime  2 g Intravenous Q12H    vancomycin (VANCOCIN) intermittent dosing cancer     Other hyperlipidemia     Acquired hypothyroidism     Colocutaneous fistula      Plan:  Continue current antibiotic therapy with vancomycin and cefepime  D/w patient's  about tpn   Will end up needing picc line for TPN    Labs, cultures, and radiographs reviewed. Changes made as necessary. D/w Patient's R.N. and specific instructions given and infectious disease issues addressed. Will follow the patient closely with you. Also please see the orders for updated patient care orders written by ID team.    Thank you for involving us in this patient's care. I will be discussing this case with the treating physicians. Please don't hesitate to call me if any questions, issues  or concerns      Please see orders for updated patient care orders written today.   Electronically signed by Giancarlo Ortega on 9/7/2018 at 3:56 PM

## 2018-09-07 NOTE — PLAN OF CARE
Problem: Pain:  Goal: Control of acute pain  Control of acute pain   Outcome: Ongoing  Pt declined any pain . Pt reminded if pain ask for medication      Problem: DISCHARGE BARRIERS  Goal: Patient's continuum of care needs are met  Outcome: Ongoing  Pt at discharge to return home with her  date pending    Problem: Cardiovascular  Goal: No DVT, peripheral vascular complications  Outcome: Ongoing  NO dvt Heparin SQ provided and walking with staff    Problem: GI  Goal: No bowel complications  Outcome: Ongoing  abd soft Fistula bag mid abd. Draining yellow andrade liquid abd flatus. Active bowel sounds    Problem:   Goal: Adequate urinary output  Outcome: Ongoing  I&O continued pt up to BR with help voiding 125-300 each time clear yellow urine     Problem: Nutrition  Goal: Understanding of nutritional guidelines  Outcome: Ongoing  Pt taking fluids in , iv fluids continued. Dietary consult yesterday and supplements provided     Problem: Skin Integrity/Risk  Goal: No skin breakdown during hospitalization  Outcome: Ongoing  Alleyvn broader on coccyx area as prevention. old scars from past bowel surgery healed.  Fistula bag mid ABD  Skin good

## 2018-09-08 LAB
CREAT SERPL-MCNC: 0.7 MG/DL (ref 0.4–1.2)
GFR SERPL CREATININE-BSD FRML MDRD: 82 ML/MIN/1.73M2
VANCOMYCIN TROUGH: 6.8 UG/ML (ref 5–15)

## 2018-09-08 PROCEDURE — 2580000003 HC RX 258: Performed by: HOSPITALIST

## 2018-09-08 PROCEDURE — 82565 ASSAY OF CREATININE: CPT

## 2018-09-08 PROCEDURE — 6360000002 HC RX W HCPCS: Performed by: INTERNAL MEDICINE

## 2018-09-08 PROCEDURE — 6360000002 HC RX W HCPCS: Performed by: PHARMACIST

## 2018-09-08 PROCEDURE — 96376 TX/PRO/DX INJ SAME DRUG ADON: CPT

## 2018-09-08 PROCEDURE — 2580000003 HC RX 258: Performed by: PHARMACIST

## 2018-09-08 PROCEDURE — 36415 COLL VENOUS BLD VENIPUNCTURE: CPT

## 2018-09-08 PROCEDURE — 6370000000 HC RX 637 (ALT 250 FOR IP): Performed by: INTERNAL MEDICINE

## 2018-09-08 PROCEDURE — 96361 HYDRATE IV INFUSION ADD-ON: CPT

## 2018-09-08 PROCEDURE — 2709999900 HC NON-CHARGEABLE SUPPLY

## 2018-09-08 PROCEDURE — 99233 SBSQ HOSP IP/OBS HIGH 50: CPT | Performed by: INTERNAL MEDICINE

## 2018-09-08 PROCEDURE — 2580000003 HC RX 258: Performed by: INTERNAL MEDICINE

## 2018-09-08 PROCEDURE — 1200000000 HC SEMI PRIVATE

## 2018-09-08 PROCEDURE — 6360000002 HC RX W HCPCS: Performed by: HOSPITALIST

## 2018-09-08 RX ORDER — FLUCONAZOLE 200 MG/1
200 TABLET ORAL EVERY 24 HOURS
Status: COMPLETED | OUTPATIENT
Start: 2018-09-08 | End: 2018-09-10

## 2018-09-08 RX ADMIN — LEVOTHYROXINE SODIUM 75 MCG: 0.07 TABLET ORAL at 06:12

## 2018-09-08 RX ADMIN — VANCOMYCIN HYDROCHLORIDE 1000 MG: 1 INJECTION, POWDER, LYOPHILIZED, FOR SOLUTION INTRAVENOUS at 00:57

## 2018-09-08 RX ADMIN — VENLAFAXINE HYDROCHLORIDE 100 MG: 50 TABLET ORAL at 09:30

## 2018-09-08 RX ADMIN — MICAFUNGIN SODIUM 100 MG: 20 INJECTION, POWDER, LYOPHILIZED, FOR SOLUTION INTRAVENOUS at 11:46

## 2018-09-08 RX ADMIN — CEFEPIME HYDROCHLORIDE 2 G: 2 INJECTION, POWDER, FOR SOLUTION INTRAVENOUS at 12:51

## 2018-09-08 RX ADMIN — HEPARIN SODIUM 5000 UNITS: 5000 INJECTION INTRAVENOUS; SUBCUTANEOUS at 21:27

## 2018-09-08 RX ADMIN — CEFEPIME HYDROCHLORIDE 2 G: 2 INJECTION, POWDER, FOR SOLUTION INTRAVENOUS at 02:06

## 2018-09-08 RX ADMIN — FLUCONAZOLE 200 MG: 200 TABLET ORAL at 16:12

## 2018-09-08 RX ADMIN — VANCOMYCIN HYDROCHLORIDE 1250 MG: 1 INJECTION, POWDER, LYOPHILIZED, FOR SOLUTION INTRAVENOUS at 18:44

## 2018-09-08 RX ADMIN — FLUOXETINE HYDROCHLORIDE 20 MG: 20 CAPSULE ORAL at 14:04

## 2018-09-08 RX ADMIN — VENLAFAXINE HYDROCHLORIDE 100 MG: 50 TABLET ORAL at 20:56

## 2018-09-08 RX ADMIN — HEPARIN SODIUM 5000 UNITS: 5000 INJECTION INTRAVENOUS; SUBCUTANEOUS at 06:08

## 2018-09-08 RX ADMIN — Medication 10 ML: at 13:56

## 2018-09-08 RX ADMIN — Medication 40 MG: at 20:56

## 2018-09-08 RX ADMIN — Medication 10 ML: at 11:47

## 2018-09-08 RX ADMIN — HEPARIN SODIUM 5000 UNITS: 5000 INJECTION INTRAVENOUS; SUBCUTANEOUS at 13:58

## 2018-09-08 RX ADMIN — DONEPEZIL HYDROCHLORIDE 5 MG: 5 TABLET, FILM COATED ORAL at 20:57

## 2018-09-08 RX ADMIN — ACETAMINOPHEN 650 MG: 325 TABLET ORAL at 04:43

## 2018-09-08 ASSESSMENT — PAIN DESCRIPTION - PAIN TYPE
TYPE: CHRONIC PAIN
TYPE: CHRONIC PAIN

## 2018-09-08 ASSESSMENT — PAIN DESCRIPTION - LOCATION
LOCATION: NECK
LOCATION: NECK

## 2018-09-08 ASSESSMENT — PAIN SCALES - GENERAL
PAINLEVEL_OUTOF10: 3
PAINLEVEL_OUTOF10: 2

## 2018-09-08 ASSESSMENT — PAIN DESCRIPTION - ORIENTATION: ORIENTATION: POSTERIOR

## 2018-09-08 ASSESSMENT — PAIN DESCRIPTION - DESCRIPTORS: DESCRIPTORS: ACHING

## 2018-09-08 NOTE — PROGRESS NOTES
levothyroxine  75 mcg Oral Daily    cefepime  2 g Intravenous Q12H    vancomycin (VANCOCIN) intermittent dosing (placeholder)   Other RX Placeholder     Continuous Infusions:   sodium chloride 75 mL/hr at 09/07/18 1859       I & O's:  I/O last 3 completed shifts: In: 4001.8 [P.O.:2150; I.V.:1851.8]  Out: 9435 [Urine:3500; Other:425]  No intake/output data recorded. Intake/Output Summary (Last 24 hours) at 09/08/18 1012  Last data filed at 09/08/18 0448   Gross per 24 hour   Intake          4001.81 ml   Output             3400 ml   Net           601.81 ml       Date 09/08/18 0000 - 09/08/18 2359   Shift 9618-5162 5227-1935 8624-7905 24 Hour Total   I  N  T  A  K  E   P.O.  (mL/kg/hr) 300  (0.7)   300    I.V.  (mL/kg) 767.2  (13.5)   767.2  (13.5)    Shift Total  (mL/kg) 1067.2  (18.8)   1067.2  (18.8)   O  U  T  P  U  T   Urine  (mL/kg/hr) 2000  (4.4)   2000    Shift Total  (mL/kg) 2000  (35.2)   2000  (35.2)   Weight (kg) 56.8 56.8 56.8 56.8           CBC:   Recent Labs      09/07/18   0827   WBC  8.0   HGB  9.2*   PLT  418*     BMP:    Recent Labs      09/07/18   0827  09/08/18   0437   NA  143   --    K  3.7   --    CL  105   --    CO2  24   --    BUN  8   --    CREATININE  0.7  0.7  0.7   GLUCOSE  112*   --      Hepatic:   No results for input(s): AST, ALT, ALB, BILITOT, ALKPHOS in the last 72 hours. BNP: No results for input(s): BNP in the last 72 hours. URINALYSIS:   Results for Adriana Garcia (MRN [id number]) as of 9/6/2018 21:38   Ref.  Range 9/6/2018 01:02   Color, UA Latest Ref Range: STRAW-YELL  YELLOW   Glucose, UA Latest Ref Range: NEGATIVE mg/dl NEGATIVE   Bilirubin, Urine Latest Ref Range: NEGATIVE  NEGATIVE   Ketones, Urine Latest Ref Range: NEGATIVE  NEGATIVE   Blood, Urine Latest Ref Range: NEGATIVE  LARGE (A)   pH, UA Latest Ref Range: 5.0 - 9.0  6.5   Protein, UA Latest Ref Range: NEGATIVE  NEGATIVE   Urobilinogen, Urine Latest Ref Range: 0.0 - 1.0 eu/dl 0.2   Nitrite, Urine Latest Ref Range: NEGATIVE  NEGATIVE   Leukocyte Esterase, Urine Latest Ref Range: NEGATIVE  MODERATE (A)   Casts UA Latest Ref Range: NONE SEEN /lpf NONE SEEN   CASTS 2 Latest Ref Range: NONE SEEN /lpf NONE SEEN   WBC, UA Latest Ref Range: 0-4/hpf /hpf 15-25   RBC, UA Latest Ref Range: 0-2/hpf /hpf 5-10   Epi Cells Latest Ref Range: 3-5/hpf /hpf 3-5   Renal Epithelial, Urine Latest Ref Range: NONE SEEN  NONE   Bacteria, UA Latest Ref Range: FEW/NONE S /hpf NONE   Yeast, Urine Latest Ref Range: NONE SEEN  FEW PSEUDOHYPHA   Crystals Latest Ref Range: NONE SEEN  NONE SEEN   Character, Urine Latest Ref Range: CLEAR-SL C  CLEAR   Specific Gravity, Urine Latest Ref Range: 1.002 - 1.03  1.008     MICRO:   picc line tip pending    IMAGING:   na    Objective:   Vitals: /65   Pulse 87   Temp 97.9 °F (36.6 °C) (Oral)   Resp 16   Ht 5' 4\" (1.626 m)   Wt 125 lb 3.2 oz (56.8 kg)   SpO2 99%   BMI 21.49 kg/m²   HEENT: Head: Normocephalic, no lesions, without obvious abnormality. Lungs: clear to auscultation bilaterally  Heart: S1, S2 normal  Abdomen: soft, non-tender; bowel sounds normal; no masses,  no organomegaly abdominal wound with ostomy bag  Extremities: extremities normal, atraumatic, no cyanosis or edema  Neurologic: Mental status: Alert, oriented, thought content appropriate          Assessment:     1. Bacteremia with gram-positive cocci as reported. 2.  Recent fever per history. 3.  Likely PICC infection. 4.  Leukocytosis. 5.  Sepsis. 6.  Abdominal wound with enterocutaneous fistula with stitches in place  noted and the abdominal wound. 7.  History of appendiceal cancer for which surgery was done in [facility]..   8. Gram positive cocci bacteremia  9. uti  With klebsiella       Patient Active Problem List:     UTI (urinary tract infection)     History of colon cancer     Other hyperlipidemia     Acquired hypothyroidism     Colocutaneous fistula      Plan:  Continue current antibiotic therapy with vancomycin and cefepime  D/w patient's  about tpn   Will end up needing picc line for TPN    Labs, cultures, and radiographs reviewed. Changes made as necessary. D/w Patient's R.N. and specific instructions given and infectious disease issues addressed. Will follow the patient closely with you. Also please see the orders for updated patient care orders written by ID team.    Thank you for involving us in this patient's care. I will be discussing this case with the treating physicians. Please don't hesitate to call me if any questions, issues  or concerns      Please see orders for updated patient care orders written today.   Electronically signed by Katherin Hoover on 9/8/2018 at 10:12 AM

## 2018-09-08 NOTE — FLOWSHEET NOTE
Patient c/o itching in miller area. States that she has taken Diflucan in the past and this has worked for her.

## 2018-09-08 NOTE — PROGRESS NOTES
Pharmacy Vancomycin Consult     Vancomycin Day: 4  Current Dosin,000 mg IV q24h    Temp max:  98.2    Recent Labs      18   0612  18   0827   BUN  19  8       Recent Labs      18   0612  18   0827   CREATININE  0.7  0.7  0.7       Recent Labs      18   0612  18   0827   WBC  10.7  8.0         Intake/Output Summary (Last 24 hours) at 18 0153  Last data filed at 18 1902   Gross per 24 hour   Intake 3731.42 ml   Output 3370 ml   Net 361.42 ml       Culture Date Source Results   18  Urine   Candida albicans    18 Tip of PICC ngtd   18 Blood cx from [facility] (?)        Ht Readings from Last 1 Encounters:   18 5' 4\" (1.626 m)        Wt Readings from Last 1 Encounters:   18 125 lb 3.2 oz (56.8 kg)         Body mass index is 21.49 kg/m². Estimated Creatinine Clearance: 60 mL/min (based on SCr of 0.7 mg/dL). Trough: 6.8    Assessment/Plan:  Will increase vancomycin dose to 1250 mg IV q18h for sub-therapeutic trough. Will plan for trough prior to 3rd dose of adjusted regimen (not yet ordered).      Bethanie Burch, PharmD, BCPS   2018  2:06 AM

## 2018-09-08 NOTE — PROGRESS NOTES
Hospitalist Progress Note    Patient:  Melani Flank      Unit/Bed:5E-56/056-A    YOB: 1945    MRN: [id number]       Acct: [de-identified]     PCP: Jewel Fernandez MD    Date of Admission: 9/5/2018    Chief Complaint: sepsis    Hospital Course: Pt is a 73 y/o admitted with sepsis. ID recommends d/cing PICC. Pt doing ok. On broad antibiotics. Surgery eval noted, leave exposed sutures alone. Continue wound care. Ostomy output fair    9/7/18: Pt doing ok, tolerating antibiotics. Hold TPN, encouraged to eat small frequent meals. ID input appreciated    9/8/18: Pt doing ok, eating better. Planned PICC and TPN on Monday. Met with .  Continue mgt  Subjective: c/o weakness      Medications:  Reviewed    Infusion Medications    sodium chloride 75 mL/hr at 09/07/18 1859     Scheduled Medications    vancomycin  1,250 mg Intravenous Q18H    micafungin (MYCAMINE) IVPB  100 mg Intravenous Daily    fluconazole  200 mg Oral Daily    donepezil  5 mg Oral Nightly    simvastatin  40 mg Oral Nightly    venlafaxine  100 mg Oral BID    sodium chloride flush  10 mL Intravenous 2 times per day    heparin (porcine)  5,000 Units Subcutaneous 3 times per day    FLUoxetine  20 mg Oral Daily    levothyroxine  75 mcg Oral Daily    cefepime  2 g Intravenous Q12H    vancomycin (VANCOCIN) intermittent dosing (placeholder)   Other RX Placeholder     PRN Meds: traZODone, sodium chloride flush, magnesium hydroxide, ondansetron, potassium chloride **OR** potassium chloride **OR** potassium chloride, magnesium sulfate, acetaminophen      Intake/Output Summary (Last 24 hours) at 09/08/18 1341  Last data filed at 09/08/18 1147   Gross per 24 hour   Intake          3173.81 ml   Output             3575 ml   Net          -401.19 ml       Diet:  DIET GENERAL; No Added Salt (3-4 GM)  Dietary Nutrition Supplements: Clear Liquid Oral Supplement  Dietary Nutrition Supplements: Other Oral Supplement (see comment)    Exam:  BP

## 2018-09-09 LAB
AEROBIC CULTURE: ABNORMAL
AEROBIC CULTURE: ABNORMAL
CREAT SERPL-MCNC: 0.8 MG/DL (ref 0.4–1.2)
GFR SERPL CREATININE-BSD FRML MDRD: 70 ML/MIN/1.73M2
GRAM STAIN RESULT: ABNORMAL
ORGANISM: ABNORMAL

## 2018-09-09 PROCEDURE — 2580000003 HC RX 258: Performed by: INTERNAL MEDICINE

## 2018-09-09 PROCEDURE — 6360000002 HC RX W HCPCS: Performed by: INTERNAL MEDICINE

## 2018-09-09 PROCEDURE — 1200000000 HC SEMI PRIVATE

## 2018-09-09 PROCEDURE — 6370000000 HC RX 637 (ALT 250 FOR IP): Performed by: INTERNAL MEDICINE

## 2018-09-09 PROCEDURE — 99233 SBSQ HOSP IP/OBS HIGH 50: CPT | Performed by: INTERNAL MEDICINE

## 2018-09-09 PROCEDURE — 2709999900 HC NON-CHARGEABLE SUPPLY

## 2018-09-09 PROCEDURE — 6360000002 HC RX W HCPCS: Performed by: PHARMACIST

## 2018-09-09 PROCEDURE — 2580000003 HC RX 258: Performed by: HOSPITALIST

## 2018-09-09 PROCEDURE — 36415 COLL VENOUS BLD VENIPUNCTURE: CPT

## 2018-09-09 PROCEDURE — 82565 ASSAY OF CREATININE: CPT

## 2018-09-09 PROCEDURE — 6360000002 HC RX W HCPCS: Performed by: HOSPITALIST

## 2018-09-09 PROCEDURE — 2580000003 HC RX 258: Performed by: PHARMACIST

## 2018-09-09 RX ORDER — LIDOCAINE HYDROCHLORIDE 10 MG/ML
5 INJECTION, SOLUTION EPIDURAL; INFILTRATION; INTRACAUDAL; PERINEURAL ONCE
Status: DISCONTINUED | OUTPATIENT
Start: 2018-09-09 | End: 2018-09-11 | Stop reason: HOSPADM

## 2018-09-09 RX ORDER — SODIUM CHLORIDE 0.9 % (FLUSH) 0.9 %
10 SYRINGE (ML) INJECTION EVERY 12 HOURS SCHEDULED
Status: DISCONTINUED | OUTPATIENT
Start: 2018-09-09 | End: 2018-09-11 | Stop reason: HOSPADM

## 2018-09-09 RX ORDER — SODIUM CHLORIDE 0.9 % (FLUSH) 0.9 %
10 SYRINGE (ML) INJECTION PRN
Status: DISCONTINUED | OUTPATIENT
Start: 2018-09-09 | End: 2018-09-11 | Stop reason: HOSPADM

## 2018-09-09 RX ADMIN — VENLAFAXINE HYDROCHLORIDE 100 MG: 50 TABLET ORAL at 10:06

## 2018-09-09 RX ADMIN — HEPARIN SODIUM 5000 UNITS: 5000 INJECTION INTRAVENOUS; SUBCUTANEOUS at 15:35

## 2018-09-09 RX ADMIN — VANCOMYCIN HYDROCHLORIDE 1250 MG: 1 INJECTION, POWDER, LYOPHILIZED, FOR SOLUTION INTRAVENOUS at 12:59

## 2018-09-09 RX ADMIN — LEVOTHYROXINE SODIUM 75 MCG: 0.07 TABLET ORAL at 06:16

## 2018-09-09 RX ADMIN — CEFEPIME HYDROCHLORIDE 2 G: 2 INJECTION, POWDER, FOR SOLUTION INTRAVENOUS at 01:11

## 2018-09-09 RX ADMIN — CEFEPIME HYDROCHLORIDE 2 G: 2 INJECTION, POWDER, FOR SOLUTION INTRAVENOUS at 12:06

## 2018-09-09 RX ADMIN — VENLAFAXINE HYDROCHLORIDE 100 MG: 50 TABLET ORAL at 21:45

## 2018-09-09 RX ADMIN — FLUOXETINE HYDROCHLORIDE 20 MG: 20 CAPSULE ORAL at 15:40

## 2018-09-09 RX ADMIN — FLUCONAZOLE 200 MG: 200 TABLET ORAL at 15:40

## 2018-09-09 RX ADMIN — DONEPEZIL HYDROCHLORIDE 5 MG: 5 TABLET, FILM COATED ORAL at 21:43

## 2018-09-09 RX ADMIN — HEPARIN SODIUM 5000 UNITS: 5000 INJECTION INTRAVENOUS; SUBCUTANEOUS at 06:12

## 2018-09-09 RX ADMIN — ACETAMINOPHEN 650 MG: 325 TABLET ORAL at 21:48

## 2018-09-09 RX ADMIN — Medication 40 MG: at 21:44

## 2018-09-09 RX ADMIN — MICAFUNGIN SODIUM 100 MG: 20 INJECTION, POWDER, LYOPHILIZED, FOR SOLUTION INTRAVENOUS at 10:03

## 2018-09-09 RX ADMIN — HEPARIN SODIUM 5000 UNITS: 5000 INJECTION INTRAVENOUS; SUBCUTANEOUS at 21:50

## 2018-09-09 RX ADMIN — SODIUM CHLORIDE: 9 INJECTION, SOLUTION INTRAVENOUS at 01:11

## 2018-09-09 ASSESSMENT — PAIN SCALES - GENERAL
PAINLEVEL_OUTOF10: 2
PAINLEVEL_OUTOF10: 0
PAINLEVEL_OUTOF10: 0

## 2018-09-09 NOTE — PROGRESS NOTES
Hospitalist Progress Note    Patient:  [facility]      Unit/Bed:5E-56/056-A    YOB: 1945    MRN: [id number]       Acct: [de-identified]     PCP: Leslie Deras MD    Date of Admission: 9/5/2018    Chief Complaint: sepsis    Hospital Course: Pt is a 73 y/o admitted with sepsis. ID recommends d/cing PICC. Pt doing ok. On broad antibiotics. Surgery eval noted, leave exposed sutures alone. Continue wound care. Ostomy output fair    9/7/18: Pt doing ok, tolerating antibiotics. Hold TPN, encouraged to eat small frequent meals. ID input appreciated    9/8/18: Pt doing ok, eating better. Planned PICC and TPN on Monday. Met with . Continue mgt    9/9/18: Pt doing ok, no fever, tolerating orals. Awaiting PICC per ID. Continue antibiotics.  D/c once cleared  Subjective: c/o weakness      Medications:  Reviewed    Infusion Medications    sodium chloride 75 mL/hr at 09/09/18 0111     Scheduled Medications    vancomycin  1,250 mg Intravenous Q18H    micafungin (MYCAMINE) IVPB  100 mg Intravenous Daily    fluconazole  200 mg Oral Q24H    donepezil  5 mg Oral Nightly    simvastatin  40 mg Oral Nightly    venlafaxine  100 mg Oral BID    sodium chloride flush  10 mL Intravenous 2 times per day    heparin (porcine)  5,000 Units Subcutaneous 3 times per day    FLUoxetine  20 mg Oral Daily    levothyroxine  75 mcg Oral Daily    cefepime  2 g Intravenous Q12H    vancomycin (VANCOCIN) intermittent dosing (placeholder)   Other RX Placeholder     PRN Meds: traZODone, sodium chloride flush, magnesium hydroxide, ondansetron, potassium chloride **OR** potassium chloride **OR** potassium chloride, magnesium sulfate, acetaminophen      Intake/Output Summary (Last 24 hours) at 09/09/18 1116  Last data filed at 09/09/18 1003   Gross per 24 hour   Intake          2696.26 ml   Output             3650 ml   Net          -953.74 ml       Diet:  DIET GENERAL; No Added Salt (3-4 GM)  Dietary Nutrition Supplements: Clear Liquid Oral Supplement  Dietary Nutrition Supplements: Other Oral Supplement (see comment)    Exam:  /61   Pulse 83   Temp 97.8 °F (36.6 °C) (Oral)   Resp 16   Ht 5' 4\" (1.626 m)   Wt 125 lb 3.2 oz (56.8 kg)   SpO2 98%   BMI 21.49 kg/m²     General appearance: No apparent distress, appears stated age and cooperative. HEENT: Pupils equal, round, and reactive to light. Conjunctivae/corneas clear. Neck: Supple, with full range of motion. Respiratory:  Normal respiratory effort. Clear to auscultation, bilaterally   Cardiovascular: Regular rate and rhythm with normal S1/S2  Abdomen: Soft, non-tender, non-distended with normal bowel sounds. Musculoskeletal: passive and active ROM x 4 extremities. Skin: Ostomy clean. Neurologic:  Grossly non-focal.  Psychiatric: Alert and oriented, thought content appropriate, normal insight  Capillary Refill: Brisk,< 3 seconds   Peripheral Pulses: +2 palpable, equal bilaterally       Labs:   Recent Labs      09/07/18 0827   WBC  8.0   HGB  9.2*   HCT  30.4*   PLT  418*     Recent Labs      09/07/18   0827  09/08/18   0437  09/09/18   0459   NA  143   --    --    K  3.7   --    --    CL  105   --    --    CO2  24   --    --    BUN  8   --    --    CREATININE  0.7  0.7  0.7  0.8   CALCIUM  9.3   --    --      No results for input(s): AST, ALT, BILIDIR, BILITOT, ALKPHOS in the last 72 hours. No results for input(s): INR in the last 72 hours. No results for input(s): Kiara Nap in the last 72 hours.     Urinalysis:      Lab Results   Component Value Date    NITRU NEGATIVE 09/06/2018    WBCUA 15-25 09/06/2018    BACTERIA NONE 09/06/2018    RBCUA 5-10 09/06/2018    BLOODU LARGE 09/06/2018    GLUCOSEU NEGATIVE 09/06/2018       Radiology:  No orders to display       Diet: DIET GENERAL; No Added Salt (3-4 GM)  Dietary Nutrition Supplements: Clear Liquid Oral Supplement  Dietary Nutrition Supplements: Other Oral Supplement (see comment)    DVT prophylaxis: [] Lovenox                                 [] SCDs                                 [x] SQ Heparin                                 [] Encourage ambulation           [] Already on Anticoagulation     Disposition:    [] Home       [] TCU       [] Rehab       [] Psych       [] SNF       [] [place]       [] Other-    Code Status: Full Code    PT/OT Eval Status:   Assessment/Plan:    Anticipated Discharge in : 2-3 days    Active Hospital Problems    Diagnosis Date Noted    History of colon cancer [Z85.038] 09/05/2018    Other hyperlipidemia [E78.4] 09/05/2018    Acquired hypothyroidism [E03.9] 09/05/2018    Colocutaneous fistula [K63.2]     UTI (urinary tract infection) [N39.0] 09/04/2018   Sepsis    1. Sepsis: ID input appreciated. Picc out, continue mgt  2. Colocutaneous fistula: Wound care.         Electronically signed by Nickolas Fallon MD on 9/9/2018 at 11:16 AM

## 2018-09-10 LAB
CREAT SERPL-MCNC: 0.7 MG/DL (ref 0.4–1.2)
GFR SERPL CREATININE-BSD FRML MDRD: 82 ML/MIN/1.73M2
VANCOMYCIN TROUGH: 15.2 UG/ML (ref 5–15)

## 2018-09-10 PROCEDURE — 99232 SBSQ HOSP IP/OBS MODERATE 35: CPT | Performed by: HOSPITALIST

## 2018-09-10 PROCEDURE — 80202 ASSAY OF VANCOMYCIN: CPT

## 2018-09-10 PROCEDURE — 6360000002 HC RX W HCPCS: Performed by: INTERNAL MEDICINE

## 2018-09-10 PROCEDURE — 6370000000 HC RX 637 (ALT 250 FOR IP): Performed by: INTERNAL MEDICINE

## 2018-09-10 PROCEDURE — 2580000003 HC RX 258: Performed by: PHARMACIST

## 2018-09-10 PROCEDURE — 1200000000 HC SEMI PRIVATE

## 2018-09-10 PROCEDURE — 36415 COLL VENOUS BLD VENIPUNCTURE: CPT

## 2018-09-10 PROCEDURE — 6360000002 HC RX W HCPCS: Performed by: HOSPITALIST

## 2018-09-10 PROCEDURE — 6360000002 HC RX W HCPCS: Performed by: PHARMACIST

## 2018-09-10 PROCEDURE — 82565 ASSAY OF CREATININE: CPT

## 2018-09-10 PROCEDURE — 2580000003 HC RX 258: Performed by: INTERNAL MEDICINE

## 2018-09-10 RX ORDER — ONDANSETRON 4 MG/1
4 TABLET, ORALLY DISINTEGRATING ORAL EVERY 6 HOURS PRN
Status: DISCONTINUED | OUTPATIENT
Start: 2018-09-10 | End: 2018-09-11 | Stop reason: HOSPADM

## 2018-09-10 RX ADMIN — VANCOMYCIN HYDROCHLORIDE 1250 MG: 1 INJECTION, POWDER, LYOPHILIZED, FOR SOLUTION INTRAVENOUS at 06:39

## 2018-09-10 RX ADMIN — HEPARIN SODIUM 5000 UNITS: 5000 INJECTION INTRAVENOUS; SUBCUTANEOUS at 13:42

## 2018-09-10 RX ADMIN — HEPARIN SODIUM 5000 UNITS: 5000 INJECTION INTRAVENOUS; SUBCUTANEOUS at 22:16

## 2018-09-10 RX ADMIN — Medication 10 ML: at 09:23

## 2018-09-10 RX ADMIN — FLUOXETINE HYDROCHLORIDE 20 MG: 20 CAPSULE ORAL at 13:35

## 2018-09-10 RX ADMIN — FLUCONAZOLE 200 MG: 200 TABLET ORAL at 15:05

## 2018-09-10 RX ADMIN — LEVOTHYROXINE SODIUM 75 MCG: 0.07 TABLET ORAL at 05:55

## 2018-09-10 RX ADMIN — HEPARIN SODIUM 5000 UNITS: 5000 INJECTION INTRAVENOUS; SUBCUTANEOUS at 05:56

## 2018-09-10 RX ADMIN — Medication 10 ML: at 20:03

## 2018-09-10 RX ADMIN — TRAZODONE HYDROCHLORIDE 50 MG: 50 TABLET ORAL at 20:03

## 2018-09-10 RX ADMIN — ONDANSETRON 4 MG: 4 TABLET, ORALLY DISINTEGRATING ORAL at 15:03

## 2018-09-10 RX ADMIN — VENLAFAXINE HYDROCHLORIDE 100 MG: 50 TABLET ORAL at 20:03

## 2018-09-10 RX ADMIN — VENLAFAXINE HYDROCHLORIDE 100 MG: 50 TABLET ORAL at 09:22

## 2018-09-10 RX ADMIN — CEFEPIME HYDROCHLORIDE 2 G: 2 INJECTION, POWDER, FOR SOLUTION INTRAVENOUS at 14:01

## 2018-09-10 RX ADMIN — DONEPEZIL HYDROCHLORIDE 5 MG: 5 TABLET, FILM COATED ORAL at 20:03

## 2018-09-10 RX ADMIN — Medication 40 MG: at 20:03

## 2018-09-10 RX ADMIN — MICAFUNGIN SODIUM 100 MG: 20 INJECTION, POWDER, LYOPHILIZED, FOR SOLUTION INTRAVENOUS at 09:20

## 2018-09-10 RX ADMIN — CEFEPIME HYDROCHLORIDE 2 G: 2 INJECTION, POWDER, FOR SOLUTION INTRAVENOUS at 01:17

## 2018-09-10 ASSESSMENT — PAIN SCALES - GENERAL
PAINLEVEL_OUTOF10: 0

## 2018-09-10 NOTE — PLAN OF CARE
Problem: Nutrition  Goal: Optimal nutrition therapy  Outcome: Ongoing  Nutrition Problem: Increased nutrient needs  Intervention: Food and/or Nutrient Delivery: Continue current diet, Continue current ONS, Continue Calorie Count  Nutritional Goals: Patient will consume 75% or greater of most meals and ONS during LOS

## 2018-09-10 NOTE — PROGRESS NOTES
INFECTIOUS DISEASES  PROGRESS NOTE    Pt Name: Mary Moreira  MRN: [id number]  [id number]  YOB: 1945  Admit Date: 9/5/2018  4:15 AM  Date of evaluation: 9/10/2018  Primary Care Physician: Josep Vanessa MD   [id number]-56/056-A      57-year-old female who it  appears has had history of problems with appendiceal cancer as per the  nursing staff informed me, ended up having a surgery done in [place],  but has had abdominal issues with nonhealing abdominal wound with what  appears to be fistula that has been present and has been noted to have an  ostomy pouch that is being applied to this area. The patient has had some  abdominal area that has sutures noted coming out of the midline and  essentially she it appears has had chronic PICC line in place and I am not  sure if TPN was being used to be given through this area. The patient  presented to the [facility] ER because of clinically not looking well  and being suspected to have UTI and she ended up being admitted to [address] [place] [address] because of her abdominal wound and complicated clinical condition  that warranted further evaluations.     The patient denies any dysuria and essentially she is not able to give much  history given her problems with dementia. Subjective: Interval History: As above. Notes reviewed. Feeing better and picc line removed.   Gram positive cocci  ( micrococcus) in the blood cultures on 9/6/18 and uti  With klebsiella     Active ATBs: vancomycin 9/6                        Cefepime 9/6    Pt/Chart review:  Fever No, DiarrheaNo, Dyspnea No, Nausea:None      Data:   Scheduled Meds:   lidocaine 1 % injection  5 mL Intradermal Once    sodium chloride flush  10 mL Intravenous 2 times per day    vancomycin  1,250 mg Intravenous Q18H    micafungin (MYCAMINE) IVPB  100 mg Intravenous Daily    fluconazole  200 mg Oral Q24H    donepezil  5 mg Oral Nightly    simvastatin  40 mg Oral Nightly    venlafaxine  100 mg Oral BID    heparin (porcine)  5,000 Units Subcutaneous 3 times per day    FLUoxetine  20 mg Oral Daily    levothyroxine  75 mcg Oral Daily    cefepime  2 g Intravenous Q12H    vancomycin (VANCOCIN) intermittent dosing (placeholder)   Other RX Placeholder     Continuous Infusions:      I & O's:  I/O last 3 completed shifts: In: 1793.2 [P.O.:880; I.V.:563.2; IV Piggyback:350]  Out: 0346 [Urine:2500; Other:525]  No intake/output data recorded. Intake/Output Summary (Last 24 hours) at 09/10/18 1416  Last data filed at 09/10/18 0413   Gross per 24 hour   Intake           1453.2 ml   Output             2225 ml   Net           -771.8 ml       Date 09/10/18 0000 - 09/10/18 2359   Shift 9348-7641 4818-8480 4180-9967 24 Hour Total   I  N  T  A  K  E   P.O.  (mL/kg/hr) 200  (0.4)   200    I.V.  (mL/kg) 60  (1)   60  (1)    Shift Total  (mL/kg) 260  (4.5)   260  (4.5)   O  U  T  P  U  T   Urine  (mL/kg/hr) 800  (1.7)   800    Emesis/NG output  (mL/kg) 0  (0)   0  (0)    Other  (mL/kg) 0  (0)   0  (0)    Stool  (mL/kg) 0  (0)   0  (0)    Blood  (mL/kg) 0  (0)   0  (0)    Shift Total  (mL/kg) 800  (13.8)   800  (13.8)   Weight (kg) 58.2 58.2 58.2 58.2           CBC:   No results for input(s): WBC, HGB, PLT in the last 72 hours. BMP:    Recent Labs      09/08/18   0437  09/09/18   0459  09/10/18   0645   CREATININE  0.7  0.8  0.7     Hepatic:   No results for input(s): AST, ALT, ALB, BILITOT, ALKPHOS in the last 72 hours. BNP: No results for input(s): BNP in the last 72 hours. URINALYSIS:   Results for Susan Lips (MRN [id number]) as of 9/6/2018 21:38   Ref.  Range 9/6/2018 01:02   Color, UA Latest Ref Range: STRAW-YELL  YELLOW   Glucose, UA Latest Ref Range: NEGATIVE mg/dl NEGATIVE   Bilirubin, Urine Latest Ref Range: NEGATIVE  NEGATIVE   Ketones, Urine Latest Ref Range: NEGATIVE  NEGATIVE   Blood, Urine Latest Ref Range: NEGATIVE  LARGE (A)   pH, UA Latest Ref Range: 5.0 - 9.0  6.5   Protein, UA Latest Ref Range: NEGATIVE

## 2018-09-10 NOTE — PROGRESS NOTES
Ketones, Urine Latest Ref Range: NEGATIVE  NEGATIVE   Blood, Urine Latest Ref Range: NEGATIVE  LARGE (A)   pH, UA Latest Ref Range: 5.0 - 9.0  6.5   Protein, UA Latest Ref Range: NEGATIVE  NEGATIVE   Urobilinogen, Urine Latest Ref Range: 0.0 - 1.0 eu/dl 0.2   Nitrite, Urine Latest Ref Range: NEGATIVE  NEGATIVE   Leukocyte Esterase, Urine Latest Ref Range: NEGATIVE  MODERATE (A)   Casts UA Latest Ref Range: NONE SEEN /lpf NONE SEEN   CASTS 2 Latest Ref Range: NONE SEEN /lpf NONE SEEN   WBC, UA Latest Ref Range: 0-4/hpf /hpf 15-25   RBC, UA Latest Ref Range: 0-2/hpf /hpf 5-10   Epi Cells Latest Ref Range: 3-5/hpf /hpf 3-5   Renal Epithelial, Urine Latest Ref Range: NONE SEEN  NONE   Bacteria, UA Latest Ref Range: FEW/NONE S /hpf NONE   Yeast, Urine Latest Ref Range: NONE SEEN  FEW PSEUDOHYPHA   Crystals Latest Ref Range: NONE SEEN  NONE SEEN   Character, Urine Latest Ref Range: CLEAR-SL C  CLEAR   Specific Gravity, Urine Latest Ref Range: 1.002 - 1.03  1.008     MICRO:   picc line tip pending    IMAGING:   na    Objective:   Vitals: /64   Pulse 95   Temp 98.8 °F (37.1 °C) (Oral)   Resp 16   Ht 5' 4\" (1.626 m)   Wt 125 lb 3.2 oz (56.8 kg)   SpO2 100%   BMI 21.49 kg/m²   HEENT: Head: Normocephalic, no lesions, without obvious abnormality. Lungs: clear to auscultation bilaterally  Heart: S1, S2 normal  Abdomen: soft, non-tender; bowel sounds normal; no masses,  no organomegaly abdominal wound with ostomy bag  Extremities: extremities normal, atraumatic, no cyanosis or edema  Neurologic: Mental status: Alert, oriented, thought content appropriate          Assessment:     1. Bacteremia with gram-positive cocci as reported. 2.  Recent fever per history. 3.  Likely PICC infection. 4.  Leukocytosis. 5.  Sepsis. 6.  Abdominal wound with enterocutaneous fistula with stitches in place  noted and the abdominal wound. 7.  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   8. Gram

## 2018-09-10 NOTE — PLAN OF CARE
Problem: Pain:  Goal: Pain level will decrease  Pain level will decrease   Outcome: Met This Shift      Problem: DISCHARGE BARRIERS  Goal: Patient's continuum of care needs are met  Outcome: Ongoing  Patient will be discharged home with spouse. Patient is current with [address] for TPN. Patient lives with spouse. Will monitor for any additional needs. Problem: Cardiovascular  Goal: No DVT, peripheral vascular complications  Outcome: Met This Shift  No s/s of DVT in BLE. Patient up in ceja with steady gait. Problem: GI  Goal: No bowel complications  Outcome: Ongoing  Patient has active bowel sounds. Patient tolerating her diet. Problem:   Goal: Adequate urinary output  Outcome: Met This Shift      Problem: Nutrition  Goal: Optimal nutrition therapy  Outcome: Met This Shift  Patient on calorie count. Patient is not taking clear supplements this shift. Patient will resume TPN at home at discharge. Problem: Skin Integrity/Risk  Goal: No skin breakdown during hospitalization  Outcome: Met This Shift  Patient has an alleyvn to coccyx for protection. Goal: Wound healing  Outcome: Ongoing  Patient has a pouch to abdominal fistula. Pouch is draining tan loose liquid. Sutures remain in place. Comments: Care plan reviewed with patient and spouse. Patient and spouse verbalize understanding of the plan of care and contribute to goal setting.

## 2018-09-10 NOTE — PROGRESS NOTES
Nutrition Assessment    Type and Reason for Visit: Reassess, Calorie Count    Nutrition Recommendations: Continue diet and ONS as ordered. Monitoring po via calorie count (see below for past 24h). Oral intake improving but If TPN resumes recommend home infusion Rx - pt. Has supplies at home and is followed by [facility]. Malnutrition Assessment:  · Malnutrition Status: At risk for malnutrition    Nutrition Diagnosis:   · Problem: Increased nutrient needs  · Etiology: related to Alteration in GI function, Acute injury/trauma     Signs and symptoms:  as evidenced by Presence of wounds (colocutaneous fistula)    Nutrition Assessment:  · Subjective Assessment: Pt. and spouse seen this am; Calorie count past 24h (3 meals) 1355 kcals, 90 grams protein - pt. eating greek yogurt,milk,chicken for majority of protein intake - unfortunately kitchen did not send the greek yogurt at dinner 9/9 or pt. Would have been very close to kcal needs for past 24h -  Met 100% protein needs; appendiceal CA with treatment in [place] and per spouse FU appt. next week there; pt. has been on home TPN with [facility] and followed by [facility] in [place] -  discussion re: new PICC and resuming TPN - would recommend home Rx continue as needed for additional nutrients but po is much better than it has been per spouse; BM x2 9/9; no BMP since 9/7; pt. is trying to take the Ensure Clear ONS but doesn't like it very well although better than the others tried this admit. Pt. 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    · Wound Type:  (colocutaneous fistula with bagging)  · Current Nutrition Therapies:  · Oral Diet Orders: No Added Salt (3-4gm)   · Oral Diet intake: %  · Oral Nutrition Supplement (ONS) Orders: Clear Liquid Oral Supplement (Ensure Clear TID)  · ONS intake: 1-25%  · Anthropometric Measures:  · Ht: 5' 4\" (162.6 cm) · Current Body Wt: 128 lb (58.1 kg) (9/10 with no edema)  · Admission Body Wt: 127 lb (57.6 kg) (9/5/18, bedscale, no edema)  · Usual Body Wt:  (weight stable and only fluctuates in ounces per )  · Ideal Body Wt: 120 lb (54.4 kg), % Ideal Body 106%  · BMI Classification: BMI 18.5 - 24.9 Normal Weight (22.1)  · Comparative Standards (Estimated Nutrition Needs):  · Estimated Daily Total Kcal: ~1625+ (~28+ kcals/kg current weight of 58 kg)  · Estimated Daily Protein (g): ~87+ (~1.5+ grams/kg current weight of 58 kg)    Estimated Intake vs Estimated Needs: Intake Improving    Nutrition Risk Level: High    Nutrition Interventions:   Continue current diet, Continue current ONS, Continue Calorie Count  Continued Inpatient Monitoring, Education Initiated, Coordination of Care (9/7 Encouraged good nutrition, protein intake, ONS intake at best efforts.)    Nutrition Evaluation:   · Evaluation: Progressing toward goals   · Goals: Patient will consume 75% or greater of most meals and ONS during LOS     · Monitoring: Meal Intake, Supplement Intake, Diet Tolerance, Skin Integrity, Wound Healing, Ascites/Edema, Weight, Comparative Standards, Pertinent Labs, Nausea or Vomiting    See Adult Nutrition Doc Flowsheet for more detail.      Electronically signed by Marisa Otero RD, DEVONTE on 9/10/18 at 10:37 AM    Contact Number: [phone]

## 2018-09-11 VITALS
OXYGEN SATURATION: 96 % | RESPIRATION RATE: 18 BRPM | HEART RATE: 95 BPM | WEIGHT: 128.2 LBS | BODY MASS INDEX: 21.89 KG/M2 | HEIGHT: 64 IN | DIASTOLIC BLOOD PRESSURE: 73 MMHG | SYSTOLIC BLOOD PRESSURE: 116 MMHG | TEMPERATURE: 96.9 F

## 2018-09-11 LAB
CREAT SERPL-MCNC: 0.8 MG/DL (ref 0.4–1.2)
GFR SERPL CREATININE-BSD FRML MDRD: 70 ML/MIN/1.73M2

## 2018-09-11 PROCEDURE — 6360000002 HC RX W HCPCS: Performed by: PHARMACIST

## 2018-09-11 PROCEDURE — A4212 NON CORING NEEDLE OR STYLET: HCPCS

## 2018-09-11 PROCEDURE — 76937 US GUIDE VASCULAR ACCESS: CPT

## 2018-09-11 PROCEDURE — C1751 CATH, INF, PER/CENT/MIDLINE: HCPCS

## 2018-09-11 PROCEDURE — 36569 INSJ PICC 5 YR+ W/O IMAGING: CPT

## 2018-09-11 PROCEDURE — 2580000003 HC RX 258: Performed by: INTERNAL MEDICINE

## 2018-09-11 PROCEDURE — 2580000003 HC RX 258: Performed by: PHARMACIST

## 2018-09-11 PROCEDURE — 36415 COLL VENOUS BLD VENIPUNCTURE: CPT

## 2018-09-11 PROCEDURE — 99239 HOSP IP/OBS DSCHRG MGMT >30: CPT | Performed by: HOSPITALIST

## 2018-09-11 PROCEDURE — 6360000002 HC RX W HCPCS: Performed by: INTERNAL MEDICINE

## 2018-09-11 PROCEDURE — 82565 ASSAY OF CREATININE: CPT

## 2018-09-11 PROCEDURE — 02HV33Z INSERTION OF INFUSION DEVICE INTO SUPERIOR VENA CAVA, PERCUTANEOUS APPROACH: ICD-10-PCS | Performed by: HOSPITALIST

## 2018-09-11 PROCEDURE — 6360000002 HC RX W HCPCS: Performed by: HOSPITALIST

## 2018-09-11 PROCEDURE — B548ZZA ULTRASONOGRAPHY OF SUPERIOR VENA CAVA, GUIDANCE: ICD-10-PCS | Performed by: HOSPITALIST

## 2018-09-11 PROCEDURE — 2709999900 HC NON-CHARGEABLE SUPPLY

## 2018-09-11 RX ORDER — CEPHALEXIN 500 MG/1
500 CAPSULE ORAL 4 TIMES DAILY
Qty: 40 CAPSULE | Refills: 0 | Status: SHIPPED | OUTPATIENT
Start: 2018-09-11 | End: 2018-09-21

## 2018-09-11 RX ORDER — CEPHALEXIN 500 MG/1
500 CAPSULE ORAL EVERY 6 HOURS SCHEDULED
Status: DISCONTINUED | OUTPATIENT
Start: 2018-09-11 | End: 2018-09-11 | Stop reason: HOSPADM

## 2018-09-11 RX ADMIN — MICAFUNGIN SODIUM 100 MG: 20 INJECTION, POWDER, LYOPHILIZED, FOR SOLUTION INTRAVENOUS at 09:47

## 2018-09-11 RX ADMIN — LEVOTHYROXINE SODIUM 75 MCG: 0.07 TABLET ORAL at 05:47

## 2018-09-11 RX ADMIN — HEPARIN SODIUM 5000 UNITS: 5000 INJECTION INTRAVENOUS; SUBCUTANEOUS at 05:48

## 2018-09-11 RX ADMIN — VANCOMYCIN HYDROCHLORIDE 1250 MG: 1 INJECTION, POWDER, LYOPHILIZED, FOR SOLUTION INTRAVENOUS at 00:45

## 2018-09-11 RX ADMIN — CEFEPIME HYDROCHLORIDE 2 G: 2 INJECTION, POWDER, FOR SOLUTION INTRAVENOUS at 02:24

## 2018-09-11 RX ADMIN — FLUOXETINE HYDROCHLORIDE 20 MG: 20 CAPSULE ORAL at 09:51

## 2018-09-11 RX ADMIN — CEFEPIME HYDROCHLORIDE 2 G: 2 INJECTION, POWDER, FOR SOLUTION INTRAVENOUS at 13:00

## 2018-09-11 RX ADMIN — Medication 10 ML: at 09:48

## 2018-09-11 RX ADMIN — VENLAFAXINE HYDROCHLORIDE 100 MG: 50 TABLET ORAL at 05:48

## 2018-09-11 RX ADMIN — HEPARIN SODIUM 5000 UNITS: 5000 INJECTION INTRAVENOUS; SUBCUTANEOUS at 14:07

## 2018-09-11 ASSESSMENT — PAIN SCALES - GENERAL
PAINLEVEL_OUTOF10: 0
PAINLEVEL_OUTOF10: 0

## 2018-09-11 NOTE — PROGRESS NOTES
WBC, UA Latest Ref Range: 0-4/hpf /hpf 15-25   RBC, UA Latest Ref Range: 0-2/hpf /hpf 5-10   Epi Cells Latest Ref Range: 3-5/hpf /hpf 3-5   Renal Epithelial, Urine Latest Ref Range: NONE SEEN  NONE   Bacteria, UA Latest Ref Range: FEW/NONE S /hpf NONE   Yeast, Urine Latest Ref Range: NONE SEEN  FEW PSEUDOHYPHA   Crystals Latest Ref Range: NONE SEEN  NONE SEEN   Character, Urine Latest Ref Range: CLEAR-SL C  CLEAR   Specific Gravity, Urine Latest Ref Range: 1.002 - 1.03  1.008     MICRO:   picc line tip pending    IMAGING:   na    Objective:   Vitals: /73   Pulse 95   Temp 96.9 °F (36.1 °C) (Oral)   Resp 18   Ht 5' 4\" (1.626 m)   Wt 128 lb 3.2 oz (58.2 kg)   SpO2 96%   BMI 22.01 kg/m²   HEENT: Head: Normocephalic, no lesions, without obvious abnormality. Lungs: clear to auscultation bilaterally  Heart: S1, S2 normal  Abdomen: soft, non-tender; bowel sounds normal; no masses,  no organomegaly abdominal wound with ostomy bag  Extremities: extremities normal, atraumatic, no cyanosis or edema  Neurologic: Mental status: Alert, oriented, thought content appropriate          Assessment:     1. Bacteremia with gram-positive cocci as reported. 2.  Recent fever per history. 3.  Likely PICC infection. 4.  Leukocytosis. 5.  Sepsis. 6.  Abdominal wound with enterocutaneous fistula with stitches in place  noted and the abdominal wound. 7.  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 8. Gram positive cocci bacteremia ( micrococcus)   9. uti  With klebsiella       Patient Active Problem List:     UTI (urinary tract infection)     History of colon cancer     Other hyperlipidemia     Acquired hypothyroidism     Colocutaneous fistula      Plan:  Continue current antibiotic therapy with vancomycin and cefepime-- till discharge  Plan po keflex 10 days ( noted allergy to cipro)   D/w patient's  about tpn   picc line    Labs, cultures, and radiographs reviewed.  Changes made as

## 2018-09-11 NOTE — PROGRESS NOTES
Nutrition Assessment    Type and Reason for Visit: Reassess, Calorie Count    Nutrition Recommendations: Continue current diet. Resume home TPN at discharge (as per [facility]). Note, Results of calorie count (past 24 hours) = 1457 kcals, 109 gm protein/day (meeting all of protein needs and 90% energy needs)    Malnutrition Assessment:  · Malnutrition Status: At risk for malnutrition    Nutrition Diagnosis:   · Problem: Increased nutrient needs  · Etiology: related to Alteration in GI function, Acute injury/trauma     Signs and symptoms:  as evidenced by Presence of wounds (colocutaneous fistula)    Nutrition Assessment:  · Subjective Assessment: Pt. Seen with spouse. Reports appetite and intake improving. Spouse states pt is eating better now than she did years ago when she was feeling well. Pt. Dislikes Ensure Clear ONS but doing well with greek yogurt and other protein sources. Will discontinue per pt request.  Pt. Reports occasional gas discomfort when gas builds up. States takes a Zofran and that helps. Rx includes ATB. 600 ml fistula output noted past 24 hours. Per pt and spouse - plan is for TPN to be resumed at discharge. [address] [place] [address] Drive would like TPN to continue until they follow up with them next week (9/20). Spoke with  - states message left for home infusion company to see if they needed anything from us. Spouse states pt was receiving TPN 12 hours/day; 5 days/week. Plan PICC line replacement today before discharge.     · Results of calorie count (past 24 hours) = 1457 kcals, 109 gm protein/day (meeting all of protein needs and 90% energy needs)  · Wound Type:  (colocutaneous fistula with bagging)  · Current Nutrition Therapies:  · Oral Diet Orders: No Added Salt (3-4gm)   · Oral Diet intake: %  · Oral Nutrition Supplement (ONS) Orders: Clear Liquid Oral Supplement  · ONS intake:  (dislikes)  · Anthropometric Measures:  · Ht: 5' 4\" (162.6 cm)

## 2018-09-11 NOTE — PROGRESS NOTES
PICC Procedure Note    Dyan Feliz   Admitted- 9/5/2018  4:15 AM  Admission diagnosis- UTI (urinary tract infection) [N39.0]  UTI (urinary tract infection) [N39.0]      Attending Physician- Kandi Romero MD  Ordering Physician-Dr Sameer Ochoa  Indication for Insertion: Antibiotic Therapy    Catheter Insertion Date- 9/11/2018   Lot Number- [id number]  Gauge-4  Lumen-single    Insertion Site- SASHA Basilic  Vein Diameter- 9.74 mm  Catheter Length- 45 cm  Internal Length- 45 cm  Exposed Catheter Length- 0cm   Upper Arm Circumference- 22cm  Tip Confirmation System Bundle met- Yes  If X-ray required, Tip Location- N/A  Radiologist- N/A    PICC insertion successful- Yes  Ultrasound- yes    Okay To Use PICC- Yes    Electronically signed by Florecita Hagen RN on 9/11/2018 at 12:53 PM

## 2018-09-11 NOTE — DISCHARGE SUMMARY
Hospital Medicine Discharge Summary      Patient Identification:   Brooke Mendenhall   : 1945  MRN: [id number]   Account: [de-identified]      Patient's PCP: Viv Garcia MD    Admit Date: 2018     Discharge Date:   2018    Admitting Physician: Gael Agudelo DO     Discharge Physician: Wilian Scott MD     Discharge Diagnoses: Active Hospital Problems    Diagnosis Date Noted    History of colon cancer [Z85.038] 2018    Other hyperlipidemia [E78.4] 2018    Acquired hypothyroidism [E03.9] 2018    Colocutaneous fistula [K63.2]     UTI (urinary tract infection) [N39.0] 2018       The patient was seen and examined on day of discharge and this discharge summary is in conjunction with any daily progress note from day of discharge. Hospital Course:   Brooke Mendenhall is a 74 y.o. female admitted to [facility] on 2018 for suspected sepsis. Initial evaluation with finding of UTI and leukocytosis. She had rare fungal elements and corynebacterium in her blood that Dr. Cally Ortiz informed me was likely contaminant. She was taken off the Vanc, Micafungin and Cefepime she has been on and he wrote for Keflex for 10 days. Her PICC line was exchanged as she is on TPN at home. She was discharged home in stable condition and in her USOH.        Exam:     Vitals:  Vitals:    09/10/18 1704 09/10/18 2000 18 0547 18 0805   BP: (!) 147/67 (!) 128/57 134/61 116/73   Pulse: 93 86 92 95   Resp:    Temp: 97.3 °F (36.3 °C) 98.6 °F (37 °C) 98.1 °F (36.7 °C) 96.9 °F (36.1 °C)   TempSrc: Oral Oral Oral Oral   SpO2: 98% 98% 97% 96%   Weight:       Height:         Weight: Weight: 128 lb 3.2 oz (58.2 kg)     24 hour intake/output:  Intake/Output Summary (Last 24 hours) at 18 1409  Last data filed at 18 0558   Gross per 24 hour   Intake                0 ml   Output             2450 ml   Net            -2450 ml         General appearance: No apparent distress, appears stated age and cooperative.  in room, both extremely pleasant and patient. HEENT: Pupils equal, round, and reactive to light. Conjunctivae/corneas clear. Neck: Supple, with full range of motion. No jugular venous distention. Trachea midline. Respiratory:  Normal respiratory effort. Clear to auscultation, bilaterally without Rales/Wheezes/Rhonchi. Cardiovascular: Regular rate and rhythm with normal S1/S2 without murmurs, rubs or gallops. Abdomen: Soft, non-tender, non-distended with normal bowel sounds. Colostomy bag with draining cutaneous fistula in placed  Musculoskeletal: passive and active ROM x 4 extremities. Skin: Skin color, texture, turgor normal.  No rashes or lesions. Neurologic:  Neurovascularly intact without any focal sensory/motor deficits. Cranial nerves: II-XII intact, grossly non-focal.  Psychiatric: Alert and oriented, thought content appropriate, normal insight  Capillary Refill: Brisk,< 3 seconds   Peripheral Pulses: +2 palpable, equal bilaterally      Labs:  For convenience and continuity at follow-up the following most recent labs are provided:      CBC:    Lab Results   Component Value Date    WBC 8.0 09/07/2018    HGB 9.2 09/07/2018    HCT 30.4 09/07/2018     09/07/2018       Renal:  Lab Results   Component Value Date     09/07/2018    K 3.7 09/07/2018    K 3.6 09/05/2018     09/07/2018    CO2 24 09/07/2018    BUN 8 09/07/2018    CREATININE 0.8 09/11/2018    CALCIUM 9.3 09/07/2018         Significant Diagnostic Studies    Radiology:   No orders to display          Consults:     IP CONSULT TO SOCIAL WORK  IP CONSULT TO GENERAL SURGERY  IP CONSULT TO INFECTIOUS DISEASES  IP CONSULT TO PHARMACY  IP CONSULT TO DIETITIAN  IP CONSULT TO CASE MANAGEMENT  IP CONSULT TO HOME CARE NEEDS    Disposition:    [] Home       [] TCU       [] Rehab       [] Psych       [] SNF       [] [place]       [x] Other- Home with Home

## 2018-09-12 NOTE — CARE COORDINATION
9/12/18, 7:34 AM    Discharge plan discussed by  and . Discharge plan reviewed with patient/ family. Patient/ family verbalize understanding of discharge plan and are in agreement with plan. Understanding was demonstrated using the teach back method. IMM Letter  IMM Letter date given[de-identified] 09/11/18  IMM Letter time given[de-identified] 9091       Patient discharged 9/11 to home with spouse and resume [facility], nursing. Spoke with Pati Tim at [facility], informed of patient discharge and patient to resume TPN as prior to admission. Faxed [facility] and Sarah OTT called report.

## 2018-10-11 PROBLEM — N39.0 UTI (URINARY TRACT INFECTION): Status: RESOLVED | Noted: 2018-09-04 | Resolved: 2018-10-11

## 2018-12-07 ENCOUNTER — HOSPITAL ENCOUNTER (OUTPATIENT)
Dept: WOUND CARE | Age: 73
Discharge: HOME | End: 2018-12-07
Payer: MEDICARE

## 2018-12-07 VITALS
HEIGHT: 64 IN | OXYGEN SATURATION: 97 % | BODY MASS INDEX: 20.25 KG/M2 | RESPIRATION RATE: 16 BRPM | TEMPERATURE: 98.2 F | HEART RATE: 106 BPM | WEIGHT: 118.6 LBS | SYSTOLIC BLOOD PRESSURE: 129 MMHG | DIASTOLIC BLOOD PRESSURE: 60 MMHG

## 2018-12-07 PROCEDURE — 99213 OFFICE O/P EST LOW 20 MIN: CPT

## 2018-12-07 PROCEDURE — 99203 OFFICE O/P NEW LOW 30 MIN: CPT | Performed by: NURSE PRACTITIONER

## 2018-12-07 NOTE — PROGRESS NOTES
complication J04.2         Plan   Patient examined and evaluated. Patient here to establish care with local ostomy provider. Her home health is discontinuing next week. She is currently getting a 4 day wear time from her current pouching system. She had numerous questions regarding her fistula and use of enemas. Instructed her to use enemas 2-3 times weekly as instructed by her surgeon in order to prevent constipation and blockage of the colon which will help prevent pressure on the fistula. Recommend continuing the current pouching system since she is getting a 4 day wear time. Continue pouching system: ostomy powder to the sore skin, dust off excess powder. Dab over top with no sting skin protectant. Repeat powder and no sting. Apply barrier ring to the opening. Apply Lizz fistula pouch with ostomy paste around the inner opening. Change pouch every 4 days and as needed. Antibiotics: No    Follow up: as needed. Call with any issues or concerns    Please see attached Discharge Instructions    Written patient dismissal instructions given to patient and signed by patient or POA. Discharge Instructions       Visit Discharge/Physician Orders: recommend getting on a schedule with enemas (mon, wed, fri or tues, thurs, sat)    Home Care: [facility]    Wound Location: Abdomen (fistula)    Continue current process of stoma powder, skin prep, stoma powder, skin prep, then moldable ring, apply paste to the back of the lizz pouch and then apply to the moldable ring. Change every 4 days    Follow up visit:  As needed    Keep next scheduled appointment. Please give 24 hour notice if unable to keep appointment. [phone]    If you experience any of the following, please call the Wound Care Service during business hours: Monday through Friday 8:00 am - 4:30 pm  ([phone]).    *Increase in pain   *Temperature over 101   *Increase in drainage from your wound or a foul odor   *Uncontrolled swelling   *Need for compression bandage changes due to slippage, breakthrough drainage    If you need medical attention outside of business hours, please contact your Primary Care Doctor or go to the nearest emergency room.      Electronically signed by RAHUL Chavez CNP on 12/7/18 at 6:18 PM                  Electronically signed by RAHUL Chavez CNP on 12/7/2018 at 6:18 PM

## 2019-03-23 NOTE — FLOWSHEET NOTE
Pt was with her . She was hoping to be well. She wanted prayer She was anointed. 09/05/18 1301   Encounter Summary   Services provided to: Patient and family together   Referral/Consult From: 01 [address] Visiting Yes  (9/5 )   Complexity of Encounter Low   Length of Encounter 15 minutes   Spiritual/Latter-day   Type Spiritual support   Assessment Approachable;Calm; Hopeful   Intervention Prayer;Nurtured hope; Active listening; Anointing   Outcome Connection/belonging;Expressed gratitude;Engaged in conversation;Expressed feelings/needs/concerns   Sacraments   Sacrament of Sick-Anointing Anointed Poor historian/unable to answer questions

## 2019-03-29 ENCOUNTER — HOSPITAL ENCOUNTER (OUTPATIENT)
Dept: WOUND CARE | Age: 74
Discharge: HOME | End: 2019-03-29
Payer: MEDICARE

## 2019-03-29 VITALS
OXYGEN SATURATION: 98 % | TEMPERATURE: 98.3 F | RESPIRATION RATE: 16 BRPM | WEIGHT: 118 LBS | DIASTOLIC BLOOD PRESSURE: 74 MMHG | BODY MASS INDEX: 20.14 KG/M2 | HEIGHT: 64 IN | SYSTOLIC BLOOD PRESSURE: 125 MMHG

## 2019-03-29 PROCEDURE — 2709999900 HC NON-CHARGEABLE SUPPLY

## 2019-03-29 PROCEDURE — 99213 OFFICE O/P EST LOW 20 MIN: CPT

## 2019-03-29 PROCEDURE — 99214 OFFICE O/P EST MOD 30 MIN: CPT | Performed by: NURSE PRACTITIONER

## 2019-03-29 RX ORDER — POTASSIUM CHLORIDE 1.5 G/1.77G
20 POWDER, FOR SOLUTION ORAL 2 TIMES DAILY
COMMUNITY

## 2019-04-26 ENCOUNTER — HOSPITAL ENCOUNTER (OUTPATIENT)
Dept: WOUND CARE | Age: 74
Discharge: HOME | End: 2019-04-26
Payer: MEDICARE

## 2019-04-26 VITALS
HEART RATE: 90 BPM | OXYGEN SATURATION: 99 % | WEIGHT: 118.8 LBS | RESPIRATION RATE: 16 BRPM | TEMPERATURE: 98.1 F | SYSTOLIC BLOOD PRESSURE: 123 MMHG | BODY MASS INDEX: 20.39 KG/M2 | DIASTOLIC BLOOD PRESSURE: 56 MMHG

## 2019-04-26 PROCEDURE — 99213 OFFICE O/P EST LOW 20 MIN: CPT

## 2019-04-26 PROCEDURE — 99214 OFFICE O/P EST MOD 30 MIN: CPT | Performed by: NURSE PRACTITIONER

## 2019-04-26 NOTE — PLAN OF CARE
Problem:  PREVENTION OF SKIN BREAKDOWN-OSTOMY  Goal: Absence of new skin breakdown  Outcome: Ongoing   Patient presents to wound and ostomy clinic for follow up of abdominal fistula. No s/s of infection. Patient afebrile. Sample kits ordered through [employer]. Instructed patient/spouse to change pouch 1-2 times a week. Instructed patient on pouch application process, no concerns voiced. Supplies    Size    Order #      Mariely UltraMax Convex Deep Urostomy Pouch  Pre-cut    1 inch  [id number]   [place] Adhesive    2401   [facility] Strips    [id number]   [place] Paste    43993   Adapt Powder  0433   Mariely UltraMax Deep Convex transparent Pouch (sample kit ordered today) Pre-cut 3/4 inch 09208   Mariely UltraMax Deep Convex transparent Pouch (sample kit ordered today) Pre-cut 5/8 inch 01714   Coloplast Assura Deep Convex Urostomy Pouch (sample kit ordered today) Pre-cut 3/4 inch  91402      Application of ostomy Pouch:  Assemble above supplies in order of application before removing pouch. Remove the paper backing. Remove your worn appliance by gently pulling away from skin and discard. Wash skin with warm water, rinse and pat dry. Inspect your skin for redness or irritation. If present, apply a small amount of powder to skin around stoma. Brush off excess powder with a Kleenex. Do not use ointments. _X__                Adapt Stoma Powder (6936)   (for wet, weepy skin)  ___                  Desenex Powder         ([employer])  (itches, rash)  Apply Misty ring to inner edge of flange. Apply Thin layer of Nu-Hope adhesive to skin around fistula and to back of pouch (let dry until it gets tacky). Apply small piece of strip paste to 10 o'clock. Center the flange around your stoma. Be sure to flatten skin when applying pouch. Smooth the adhesive collar to your abdomen. Apply elastic barrier strips to outer edge of pouch. Apply belt snuggly.

## 2019-04-26 NOTE — DISCHARGE INSTR - COC
Continuity of Care Form    Patient Name: Bernadette Vee   :  1945  MRN:  [id number]    Admit date:  2019  Discharge date:  ***    Code Status Order: Prior   Advance Directives:   [facility] Documentation     Date/Time Healthcare Directive Type of Healthcare Directive Copy in [address] Agent's Name Healthcare Agent's Phone Number    [phone]  Yes, patient has an advance directive for healthcare treatment  Living will;Durable power of  for health care  No, copy requested from family  Spouse -- --          Admitting Physician:  No admitting provider for patient encounter. PCP: Magdiel Schultz MD    Discharging Nurse: [facility] Unit/Room#: No information available for this encounter. Discharging Unit Phone Number: ***    Emergency Contact:   Extended Emergency Contact Information  Primary Emergency Contact: Evert Hernandez  Address: [place] [address] Phone: [phone]  Relation: Spouse    Past Surgical History:  Past Surgical History:   Procedure Laterality Date    ABDOMEN SURGERY      APPENDECTOMY      HERNIA REPAIR      7 months ago, mesh    HYSTERECTOMY      THORACOTOMY         Immunization History: There is no immunization history on file for this patient.     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    Peristomal skin complication T79.1       Isolation/Infection:   Isolation          No Isolation            Nurse Assessment:  Last Vital Signs: BP (!) 123/56   Pulse 90   Temp 98.1 °F (36.7 °C) (Oral)   Resp 16   Wt 118 lb 12.8 oz (53.9 kg)   SpO2 99%   BMI 20.39 kg/m²     Last documented pain score (0-10 scale):    Last Weight:   Wt Readings from Last 1 Encounters:   19 118 lb 12.8 oz (53.9 kg)     Mental Status:  {IP PT MENTAL STATUS:96047}    IV Access:  { FRANCHESCA IV ACCESS:576395047}    Nursing Mobility/ADLs:  Walking   {CHP DME ACJT:785519212}  Transfer  {CHP DME NWTU:318208990}  Bathing  {CHP DME AGJN:987286394}  Dressing  {CHP DME XFYS:886051679}  Toileting  {CHP DME ISUQ:894765218}  Feeding  {CHP DME OMRL:672392047}  Med Admin  {CHP DME GMUU:275949678}  Med Delivery   { FRANCHESCA MED Delivery:932685003}    Wound Care Documentation and Therapy:        Elimination:  Continence:   · Bowel: {YES / UM:13931}  · Bladder: {YES / HQ:10628}  Urinary Catheter: {Urinary Catheter:809970934}   Colostomy/Ileostomy/Ileal Conduit: {YES / NO:59433}  LDA Fistula Umbilicus-Stomal Appliance: 1 piece  LDA Fistula Umbilicus-Flange Size (inches): (1 inch)  LDA Fistula Umbilicus-Stoma  Assessment: Red, Moist  LDA Fistula Umbilicus-Mucocutaneous Junction: Intact  LDA Fistula Umbilicus-Peristomal Assessment: Denuded, Red  LDA Fistula Umbilicus-Treatment: Bag change, Heat applied, Site care(NuHope adhesive, Misty ring)  LDA Fistula Umbilicus-Stool Appearance: Loose  LDA Fistula Umbilicus-Stool Color: Tan (Comment)  LDA Fistula Umbilicus-Stool Amount: Small    Date of Last BM: ***  No intake or output data in the 24 hours ending 04/26/19 1536  No intake/output data recorded.     Safety Concerns:     [address] [email or web address] Safety Concerns:760092110}    Impairments/Disabilities:      [address] [email or web address] Impairments/Disabilities:163186562}    Nutrition Therapy:  Current Nutrition Therapy:   [address] [email or web address] Diet List:739306521}    Routes of Feeding: {CHP DME Other Feedings:549071819}  Liquids: {Slp liquid thickness:48461}  Daily Fluid Restriction: {CHP DME Yes amt example:287119323}  Last Modified Barium Swallow with Video (Video Swallowing Test): {Done Not Done [facility]:438225510}    Treatments at the Time of Hospital Discharge:   Respiratory Treatments: ***  Oxygen Therapy:  {Therapy; copd oxygen:05029}  Ventilator:    { CC Vent LACY:310258462}    Rehab Therapies: {THERAPEUTIC INTERVENTION:1211416423}  Weight Bearing Status/Restrictions: {[facility] Weight Bearin}  Other Medical Equipment (for information only, NOT a DME order):  {EQUIPMENT:394747279}  Other Treatments: ***    Patient's personal belongings (please select all that are sent with patient):  {[facility] DME Belongings:808545730}    RN SIGNATURE:  {Esignature:016101204}    CASE MANAGEMENT/SOCIAL WORK SECTION    Inpatient Status Date: ***    Readmission Risk Assessment Score:  Readmission Risk              Risk of Unplanned Readmission:        0           Discharging to Facility/ Agency   · Name:   · Address:  · Phone:  · Fax:    Dialysis Facility (if applicable)   · Name:  · Address:  · Dialysis Schedule:  · Phone:  · Fax:    / signature: {Esignature:715430204}    PHYSICIAN SECTION    Prognosis: {Prognosis:0299260421}    Condition at Discharge: [address] [place] [address] Patient Condition:554204552}    Rehab Potential (if transferring to Rehab): {Prognosis:6668089145}    Recommended Labs or Other Treatments After Discharge: ***    Physician Certification: I certify the above information and transfer of Lamar Nolen  is necessary for the continuing treatment of the diagnosis listed and that she requires {Admit to Appropriate Level of Care:78396} for {GREATER/LESS:765566175} 30 days.      Update Admission H&P: {CHP DME Changes in [facility]:499894916}    PHYSICIAN SIGNATURE:  {Esignature:637412352}

## 2019-05-02 NOTE — PROGRESS NOTES
[address] [address]  Ostomy Progress Note      NAME:  Alyson Coffman  MEDICAL RECORD NUMBER:  [id number]  AGE: 74 y.o. GENDER:  female  :  1945  TODAY'S DATE:  2019    Subjective       Chief Complaint   Patient presents with    Wound Check     Abdomen fistula         HISTORY of PRESENT ILLNESS HPI     Alyson Coffman is a 74 y.o. female Established patient referred by Dr. Virginia العراقي, who presents today for ostomy/stoma evaluation. 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 She would like to establish with a local ostomy provider in case she has trouble. She currently has home health and has an ostomy specialist caring her but will be discharged in the next week. She currently is using an ostomy powder, no sting barrier wipe, adapt ring, ostomy paste, and an Misty pouch and is getting a 4 day wear time. She continues to follow with her surgeon at [facility]. and last seen him 2 weeks ago. She was having increased output from her fistula which was felt to be related to constipation, surgeon recommended enemas 2-3 times weekly. She states that her output dramatically slows down a following her enema and is averaging 30-100ml of output per day.   3/29/19: Patient's fistula has been averaging between 20-60 ml of output. She is no longer doing enemas and has been having regular bowel movements. She has been struggling with skin irritation around the opening of the fistula. She is changing her fistula pouch every 3 days. She would like to be followed on a regular basis. She has home health private duty that comes out every 3-4 weeks that they would like to cancel. 19: Home health has been discontinued.  The Mariely pouches are overall working well but she is getting some skin irritation around the opening and the pouches are melting out. Currently getting 3 days of wear time. She does not want to switch back to the Misty pouch due to its bulkiness and the time consuming application. Wound/Ulcer Pain Timing/Severity: none  Quality of pain: N/A  Severity:  0 / 10   Modifying Factors: None  Associated Signs/Symptoms: erythema    Interval History:   Current ostomy care includes:      Ostomy powder applied to the sore skin around the fistula, dust off excess powder. Thin layer of NuHope adhesive applied to the skin around the fistula and to the back of the pouch, allow it to dry until it becomes tacky. Piece of brava strip paste applied to the deep crease at 10 o'clock. Apply a Mariely deep convex urostomy pouch precut to 1 inch with Misty ring. Brava elastic barrier strips applied to the outer edge of the pouch to secure a seal. 1 inch ostomy belt was applied snugly. PAST MEDICAL HISTORY        Diagnosis Date    Cancer [facility])     History of blood transfusion     7 months ago    Hyperlipidemia     Thyroid disease        PAST SURGICAL HISTORY    Past Surgical History:   Procedure Laterality Date    ABDOMEN SURGERY      APPENDECTOMY      HERNIA REPAIR      7 months ago, mesh    HYSTERECTOMY      THORACOTOMY         FAMILY HISTORY    History reviewed. No pertinent family history.     SOCIAL HISTORY    Social History     Tobacco Use    Smoking status: Never Smoker    Smokeless tobacco: Never Used   Substance Use Topics    Alcohol use: No    Drug use: No       ALLERGIES    Allergies   Allergen Reactions    Demerol Hcl [Meperidine]     Molds & Smuts     Bactrim [Sulfamethoxazole-Trimethoprim] Rash    Ciprofloxacin Rash    Penicillins Rash       MEDICATIONS    Current Outpatient Medications on File Prior to Encounter   Medication Sig Dispense Refill    vitamin D (CHOLECALCIFEROL) 1000 UNIT TABS tablet Take 1,000 Units by mouth daily  potassium chloride (KLOR-CON) 20 MEQ packet Take 20 mEq by mouth 2 times daily      Omeprazole Magnesium (PRILOSEC OTC PO) Take by mouth      Multiple Vitamin (MULTI VITAMIN DAILY PO) Take by mouth      venlafaxine (EFFEXOR) 100 MG tablet Take 100 mg by mouth 2 times daily      donepezil (ARICEPT) 5 MG tablet Take 5 mg by mouth nightly      simvastatin (ZOCOR) 40 MG tablet Take 40 mg by mouth nightly      FLUoxetine (PROZAC) 20 MG capsule Take 20 mg by mouth daily      traZODone (DESYREL) 50 MG tablet Take 50 mg by mouth nightly      levothyroxine (SYNTHROID) 75 MCG tablet Take 75 mcg by mouth Daily       No current facility-administered medications on file prior to encounter.         REVIEW OF SYSTEMS    Constitutional: negative for chills, fevers and sweats  Eyes: negative for irritation and redness  Ears, nose, mouth, throat, and face: negative for hearing loss and hoarseness  Respiratory: negative for cough and shortness of breath  Cardiovascular: negative for chest pain  Gastrointestinal: positive for enterocutaneous fistula, negative for nausea, constipation, and vomiting  Integument/breast: positive for perifistula skin irritation  Musculoskeletal:negative for muscle weakness  Neurological: negative for coordination problems, gait problems and speech problems  Behavioral/Psych: positive for good mood    Objective    BP (!) 123/56   Pulse 90   Temp 98.1 °F (36.7 °C) (Oral)   Resp 16   Wt 118 lb 12.8 oz (53.9 kg)   SpO2 99%   BMI 20.39 kg/m²     Wt Readings from Last 3 Encounters:   04/26/19 118 lb 12.8 oz (53.9 kg)   03/29/19 118 lb (53.5 kg)   12/07/18 118 lb 9.6 oz (53.8 kg)       PHYSICAL EXAM    General Appearance: alert and oriented to person, place and time  Skin: warm and dry  Head: normocephalic and atraumatic  Eyes: conjunctivae normal  ENT: hearing grossly normal bilaterally  Pulmonary/Chest: clear to auscultation bilaterally- no wheezes, rales or rhonchi, normal air movement, no respiratory distress  Cardiovascular: normal rate and regular rhythm  Abdomen: soft, non-tender and bowel sounds normal  Extremities: no cyanosis and no clubbing  Musculoskeletal: normal range of motion of extremities x 4, no joint swelling, deformity or tenderness  Neurologic: gait and coordination normal and speech normal  Mid abdomen fistula: Fistula measures approx 1.4cm x 0.5cm and is located deep in a crevice with multiple creases and scarring around the edges. There is irritated skin noted around the opening of the fistula extending out approx 1 inch. No warmth or induration noted. LABS       CBC:   Lab Results   Component Value Date    WBC 8.0 09/07/2018    HGB 9.2 09/07/2018    HCT 30.4 09/07/2018    MCV 84.9 09/07/2018     09/07/2018     BMP:   Lab Results   Component Value Date     09/07/2018    K 3.7 09/07/2018    K 3.6 09/05/2018     09/07/2018    CO2 24 09/07/2018    BUN 8 09/07/2018    CREATININE 0.8 09/11/2018     PT/INR: No results found for: PROTIME, INR  Prealbumin: No results found for: PREALBUMIN  Albumin:  Lab Results   Component Value Date    LABALBU 3.4 09/05/2018     Sed Rate:No results found for: SEDRATE  Micro: No results found for: 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 She has some increased perifistula skin irritation using the Mariely pouch rather then the Misty pouch. She does not want to switch back to the Misty pouch and would like to continue to pursue options to help pouch the fistula using an ostomy pouch so that it is less bulky. Will try reducing the opening in the back of the pouch to help protect more skin. Educated patient and her  on pouching application techniques.     Samples ordered of the following for the patient and her  to try:      Mariely deep convex pouches precut to 3/4 inch and and 5/8 inch. Coloplast Assura deep convex precut to 3/4 inch    Ostomy powder applied to the sore skin around the fistula, dusted off excess powder. Misty ring applied to the back of the pouch. Thin layer of NuHope adhesive applied to the back of the flange and the skin around the fistula. Allow it to dry until it became tacky. Apply piece of Brava strip paste the the deep crease at 10 o'clock. Apply Mariely deep convex pouch precut to 1 inch. Brava elastic barrier strips applied to the outer edge to help secure a seal. 1 inch ostomy belt applied snugly. Antibiotics: No    Follow up: 5 weeks    Please see attached Discharge Instructions    Written patient dismissal instructions given to patient and signed by patient or POA. Discharge Instructions         Visit Discharge/Physician Orders:    -Sample kits will be ordered through [facility]. Wound Location: Abdomen (fistula)      Visit Discharge/Physician Orders:                  Supplies    Size    Order #      Mariely UltraMax Convex Deep Urostomy Pouch  Pre-cut    1 inch  [id number]   Nu-Hope Adhesive    [address]   [facility]    [address] Elastic Barrier Strips    [id number]   [place] Paste    [id number]   Adapt Powder  0363   Mariely UltraMax Deep Convex transparent Pouch (sample kit ordered today) Pre-cut 3/4 inch 66016   Mariely UltraMax Deep Convex transparent Pouch (sample kit ordered today) Pre-cut 5/8 inch 25719   Coloplast Assura Deep Convex Urostomy Pouch (sample kit ordered today) Pre-cut 3/4 inch  37461      Change your pouch 1-2        times / week.     Application of ostomy Pouch:  1. Assemble above supplies in order of application before removing pouch. 2. Remove the paper backing. 3. Remove your worn appliance by gently pulling away from skin and discard. 4. Wash skin with warm water, rinse and pat dry. 5. Inspect your skin for redness or irritation. If present, apply a small amount of powder to skin around stoma. Brush off excess powder with a Kleenex. Do not use ointments. _X__                Adapt Stoma Powder (7512)   (for wet, weepy skin)   ___                  Desenex Powder         ([employer])  (itches, rash)  6. Apply Misty ring to inner edge of flange. 7. Apply Thin layer of Nu-Hope adhesive to skin around fistula and to back of pouch (let dry until it gets tacky). 8. Apply small piece of strip paste to 10 o'clock. 9. Center the flange around your stoma. Be sure to flatten skin when applying pouch. Smooth the adhesive collar to your abdomen. 10. Apply elastic barrier strips to outer edge of pouch. 11. Apply belt snuggly. 12. Empty when a 1/3 full.       [facility]:         Hours: Monday - Friday, 8:00 a.m. - 4:00 p.m. Phone: [phone]  Bring supplies with you to each visit    Follow up visit:  5 weeks on Friday May 31st at 1:30 pm     Keep next scheduled appointment. Please give 24 hour notice if unable to keep appointment. [phone]     If you experience any of the following, please call the Wound Care Service during business hours: Monday through Friday 8:00 am - 4:30 pm  ([phone]).             *Increase in pain              *Temperature over 101              *Increase in drainage from your wound or a foul odor              *Uncontrolled swelling              *Need for compression bandage changes due to slippage, breakthrough drainage     If you need medical attention outside of business hours, please contact your Primary Care Doctor or go to the nearest emergency room.      Electronically signed by RAHUL Jordan CNP on 5/1/19 at 10:09 PM            Electronically signed by RAHUL Jordan CNP on 5/1/2019 at 10:11 PM

## 2019-05-31 ENCOUNTER — HOSPITAL ENCOUNTER (OUTPATIENT)
Dept: WOUND CARE | Age: 74
Discharge: HOME | End: 2019-05-31
Payer: MEDICARE

## 2019-05-31 VITALS
RESPIRATION RATE: 18 BRPM | SYSTOLIC BLOOD PRESSURE: 129 MMHG | HEART RATE: 98 BPM | TEMPERATURE: 98.1 F | WEIGHT: 127.3 LBS | DIASTOLIC BLOOD PRESSURE: 60 MMHG | OXYGEN SATURATION: 99 % | BODY MASS INDEX: 21.85 KG/M2

## 2019-05-31 PROCEDURE — 99213 OFFICE O/P EST LOW 20 MIN: CPT

## 2019-05-31 PROCEDURE — 99214 OFFICE O/P EST MOD 30 MIN: CPT | Performed by: NURSE PRACTITIONER

## 2019-05-31 NOTE — PROGRESS NOTES
[address] [place] [id number]  Ostomy Progress Note      NAME:  Blade Reynaga  MEDICAL RECORD NUMBER:  [id number]  AGE: 74 y.o. GENDER:  female  :  1945  TODAY'S DATE:  2019    Subjective       Chief Complaint   Patient presents with    Wound Check     Abdomen fistula         HISTORY of PRESENT ILLNESS HPI     Blade Reynaga is a 74 y.o. female Established patient referred by Dr. Mary Greene, who presents today for ostomy/stoma evaluation. 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 She has a history of appendiceal carcinoma and was treated with ileostomy and intraperitoneal chemotherapy in  at [facility]. She continues to follow with her surgeon at [facility]. periodically. She is currently getting between 40-60 mL of stool from her fistula per day. The Coloplast deep convex Assura pouch has been working well for her since she has been getting approximately 3 days of wear time out of her pouch. She does however continue to have melting out on the back of the pouch. Wound/Ulcer Pain Timing/Severity: none  Quality of pain: N/A  Severity:  0 / 10   Modifying Factors: None  Associated Signs/Symptoms: erythema    Interval History:   Current ostomy care includes:    Ostomy powder applied to the sore skin around the fistula, dusted off excess powder. Misty ring applied to the back of the pouch. Thin layer of NuHope adhesive applied to the back of the flange and the skin around the fistula. Allow it to dry until it became tacky. Apply piece of Brava strip paste the the deep crease at 10 o'clock. Apply Convatec Assura deep convex pouch precut to 3/4 inch. Brava elastic barrier strips applied to the outer edge to help secure a seal. 1 inch ostomy belt applied snugly.      PAST MEDICAL HISTORY        Diagnosis Date    Cancer [facility])     History of blood transfusion     7 months ago    Hyperlipidemia     Thyroid disease        PAST SURGICAL HISTORY    Past Surgical History:   Procedure Laterality Date    ABDOMEN SURGERY      APPENDECTOMY      HERNIA REPAIR      7 months ago, mesh    HYSTERECTOMY      THORACOTOMY         FAMILY HISTORY    History reviewed. No pertinent family history. SOCIAL HISTORY    Social History     Tobacco Use    Smoking status: Never Smoker    Smokeless tobacco: Never Used   Substance Use Topics    Alcohol use: No    Drug use: No       ALLERGIES    Allergies   Allergen Reactions    Demerol Hcl [Meperidine]     Molds & Smuts     Bactrim [Sulfamethoxazole-Trimethoprim] Rash    Ciprofloxacin Rash    Penicillins Rash       MEDICATIONS    Current Outpatient Medications on File Prior to Encounter   Medication Sig Dispense Refill    vitamin D (CHOLECALCIFEROL) 1000 UNIT TABS tablet Take 1,000 Units by mouth daily      potassium chloride (KLOR-CON) 20 MEQ packet Take 20 mEq by mouth 2 times daily      Omeprazole Magnesium (PRILOSEC OTC PO) Take by mouth      Multiple Vitamin (MULTI VITAMIN DAILY PO) Take by mouth      venlafaxine (EFFEXOR) 100 MG tablet Take 100 mg by mouth 2 times daily      donepezil (ARICEPT) 5 MG tablet Take 5 mg by mouth nightly      simvastatin (ZOCOR) 40 MG tablet Take 40 mg by mouth nightly      FLUoxetine (PROZAC) 20 MG capsule Take 20 mg by mouth daily      traZODone (DESYREL) 50 MG tablet Take 50 mg by mouth nightly      levothyroxine (SYNTHROID) 75 MCG tablet Take 75 mcg by mouth Daily       No current facility-administered medications on file prior to encounter.         REVIEW OF SYSTEMS    Constitutional: negative for chills, fevers and sweats  Eyes: negative for irritation and redness  Ears, nose, mouth, throat, and face: negative for hearing loss and hoarseness  Respiratory: negative for cough and shortness of breath  Cardiovascular: negative for chest pain  Gastrointestinal: positive for enterocutaneous INR  Prealbumin: No results found for: PREALBUMIN  Albumin:  Lab Results   Component Value Date    MANDA 3.4 09/05/2018     Sed Rate:No results found for: SEDRATE  Micro: No results found for: [facility]     Assessment     Colocutaneous fistula  Peristomal skin complication    Patient Active Problem List   Diagnosis Code    History of colon cancer Z85.038    Other hyperlipidemia E78.49    Acquired hypothyroidism E03.9    Colocutaneous fistula K63.2    Peristomal skin complication P33.6         Plan   Patient examined and evaluated. Patient is doing well with the Coloplast assura deep convex pouching system. She is getting 3 days of where time out of her pouch. She continues to get some irritation right around the opening of the fistula. Her  has been trying different things to help get a better seal.  Gave him some new suggestions today such as putting the Lizz ring closer to the opening on the back of pouch and applying an extra layer of nu Hope adhesive over the ring after it is applied. Apply ostomy powder to the skin around the fistula, dust off excess powder. Applied a thin layer of nu hope adhesive to the skin around the fistula and to the back of the pouch. Applied a piece of strip paste to the crease at 10 and 3 o'clock. Lizz ring applied to the back of the pouch right around the opening. Thin layer of NuHope adhesive applied over the lizz ring and strip paste, allowed it to dry until it became tacky. Applied Coloplast Assura deep convex pouch precut to 3/4 inch. Brava elastic barrier strips applied to the outer edge of the pouch to help secure. Patient and her  were education on application. See discharge instructions for product numbers and application instructions. Antibiotics: No    Follow up: 6 weeks    Please see attached Discharge Instructions    Written patient dismissal instructions given to patient and signed by patient or POA.          Discharge Instructions Visit Discharge/Physician Orders:   -Have Shayy 53 send over prescriptions to be signed.     Wound Location: Abdomen (fistula)      Visit Discharge/Physician Orders:        Supplies    Size    Order #      Nu-Hope Adhesive    [address] Strips    [id number]   Fiona Hyman Ring  [id number]   [place] Paste    [id number]   Adapt Powder   [id number]   Coloplast Assura Deep Convex Urostomy Pouch Pre-cut 3/4 inch  [id number]      Change your pouch 1-2        times / week.     Application of ostomy Pouch:  1. Assemble above supplies in order of application before removing pouch. 2. Remove the paper backing.    3. Remove your worn appliance by gently pulling away from skin and discard. 4. Wash skin with warm water, rinse and pat dry.    5. Inspect your skin for redness or irritation.  If present, apply a small amount of powder to skin around stoma.  Brush off excess powder with a Kleenex. Do not use ointments. _X__                Adapt Stoma Powder (7906)   (for wet, weepy skin)   ___                  Desenex Powder         ([employer])  (itches, rash)  6. Apply Lizz ring to inner edge of flange. 7. Apply thin layer of Nu-Hope adhesive to skin around fistula and to back of pouch and back of lizz ring (let dry until it gets tacky). 8. Apply small piece of strip paste to 10 o'clock and 3 o'clock positions. 9. Center the flange around your stoma. Be sure to flatten skin when applying pouch. Smooth the adhesive collar to your abdomen. 10. Apply elastic barrier strips to outer edge of pouch. 11. Empty when a 1/3 full.       [facility]:         Hours: Monday - Friday, 8:00 a.m. - 4:00 p.m.              Phone: [phone]  Bring supplies with you to each visit     Follow up visit:  6 weeks on Friday July 12th at 1:30 pm     Keep next scheduled appointment. Please give 24 hour notice if unable to keep appointment.  [phone]     If you experience any of the following, please call the Wound Care Service during business hours: Monday through Friday 8:00 am - 4:30 pm  ([phone]).             *Increase in pain              *Temperature over 101              *Increase in drainage from your wound or a foul odor              *Uncontrolled swelling              *Need for compression bandage changes due to slippage, breakthrough drainage     If you need medical attention outside of business hours, please contact your Primary Care Doctor or go to the nearest emergency room.      Electronically signed by RAHUL Asif CNP on 5/31/19 at 2:24 PM            Electronically signed by RAHUL Asif CNP on 5/31/2019 at 2:24 PM

## 2019-05-31 NOTE — PLAN OF CARE
Problem: Bowel/Gastric:  Goal: Gastrointestinal status for postoperative course will improve  Description  Gastrointestinal status for postoperative course will improve  Outcome: Ongoing   Patient presents to wound and ostomy clinic for follow up for fistula care. No s/s of infection. Patient afebrile. Instructed patient to have Shayy Pal send over prescriptions to be signed for ostomy supplies. Follow up visit:  6 weeks on Friday July 12th at 1:30 pm.    Supplies    Size    Order #      Nu-Hope Adhesive    [id number]   OKHQLXPBL BAKFV HYDGOOM HQTSNIO Strips    [id number]   Janett Banerjee  [id number]   Monica Mering Paste    [id number]   Adapt Powder   1868   Coloplast Assura Deep Convex Urostomy Pouch Pre-cut 3/4 inch  [id number]      Change your pouch 1-2        times / week. Application of ostomy Pouch:  Assemble above supplies in order of application before removing pouch. Remove the paper backing. Remove your worn appliance by gently pulling away from skin and discard. Wash skin with warm water, rinse and pat dry. Inspect your skin for redness or irritation. If present, apply a small amount of powder to skin around stoma. Brush off excess powder with a Kleenex. Do not use ointments. _X__                Adapt Stoma Powder (5168)   (for wet, weepy skin)  ___                  Desenex Powder         ([employer])  (itches, rash)  Apply Misty ring to inner edge of flange. Apply thin layer of Nu-Hope adhesive to skin around fistula and to back of pouch and back of misty ring (let dry until it gets tacky). Apply small piece of strip paste to 10 o'clock and 3 o'clock positions. Center the flange around your stoma. Be sure to flatten skin when applying pouch. Smooth the adhesive collar to your abdomen. Apply elastic barrier strips to outer edge of pouch. Empty when a 1/3 full. Care plan reviewed with patient and spouse.   Patient and spouse verbalize understanding of the plan of care and contribute to goal setting.

## 2019-07-12 ENCOUNTER — HOSPITAL ENCOUNTER (OUTPATIENT)
Dept: WOUND CARE | Age: 74
Discharge: HOME | End: 2019-07-12
Payer: MEDICARE

## 2019-07-12 VITALS
WEIGHT: 127 LBS | SYSTOLIC BLOOD PRESSURE: 119 MMHG | OXYGEN SATURATION: 99 % | HEIGHT: 64 IN | BODY MASS INDEX: 21.68 KG/M2 | RESPIRATION RATE: 18 BRPM | TEMPERATURE: 98.4 F | HEART RATE: 95 BPM | DIASTOLIC BLOOD PRESSURE: 64 MMHG

## 2019-07-12 PROCEDURE — 99214 OFFICE O/P EST MOD 30 MIN: CPT | Performed by: NURSE PRACTITIONER

## 2019-07-12 PROCEDURE — 99213 OFFICE O/P EST LOW 20 MIN: CPT

## 2019-07-12 NOTE — PROGRESS NOTES
Adhesive    [address] Strips    [id number]   Misty Cohesive Ring - not using at this time but keeping on supply list if patient wants to reuse again    [id number]   Samir Garg Paste    [id number]   Adapt Powder   7906   Mariely deep convex urostomy  Pre-cut 1 1/8 inch  42825      Change your pouch 1-2        times / week.     Application of ostomy Pouch:  1. Assemble above supplies in order of application before removing pouch. 2. Remove the paper backing.    3. Remove your worn appliance by gently pulling away from skin and discard. 4. Wash skin with warm water, rinse and pat dry.    5. Inspect your skin for redness or irritation.  If present, apply a small amount of powder to skin around stoma.  Brush off excess powder with a Kleenex. Do not use ointments. _X__                Adapt Stoma Powder (7906)   (for wet, weepy skin)   ___                  Desenex Powder         ([employer])  (itches, rash)    6. Apply thin layer of Nu-Hope adhesive to skin around fistula and to back of pouch. 7. Apply small piece of strip paste to 10 o'clock and 3 o'clock positions. 8. Center the flange around your stoma. Be sure to flatten skin when applying pouch. Smooth the adhesive collar to your abdomen. 9. Apply elastic barrier strips to outer edge of pouch. 10. Empty when a 1/3 full.       [facility]:         Hours: Monday - Friday, 8:00 a.m. - 4:00 p.m.              Phone: [phone]  Bring supplies with you to each visit     Follow up visit:  7 weeks August 30th @ 1:30 pm      Keep next scheduled appointment. Please give 24 hour notice if unable to keep appointment. [phone]     If you experience any of the following, please call the Wound Care Service during business hours: Monday through Friday 8:00 am - 4:30 pm  ([phone]).               *Increase in pain              *Temperature over 101              *Increase in drainage from your wound or a foul odor              *Uncontrolled swelling              *Need for compression bandage changes due to slippage, breakthrough drainage     If you need medical attention outside of business hours, please contact your Primary Care Doctor or go to the nearest emergency room.      Electronically signed by RAHUL Ahmadi CNP on 7/12/19 at 2:21 PM                          Electronically signed by RAHUL Ahmadi CNP on 7/12/2019 at 2:21 PM

## 2019-07-12 NOTE — PLAN OF CARE
Patient presents to wound and ostomy clinic for follow up for fistula care. No s/s of infection. Patient afebrile. Instructed patient to have Pile[employer]rogen 53 send over prescriptions to be signed for ostomy supplies. Samples requested to be sent to pt by [facility]. Next appt in 7 weeks. Supplies    Size    Order #      Nu-Hope Adhesive    [id number]   HPIRRBXFT XWXSN CGWVYLI QULHFUA Strips    [id number]   [employer] - not using at this time but keeping on supply list if patient wants to reuse again    [id number]   [facility] Paste    [id number]   Adapt Powder   7906   Mariely deep convex urostomy  Pre-cut 1 1/8 inch  06032      Change your pouch 1-2        times / week. Application of ostomy Pouch:  Assemble above supplies in order of application before removing pouch. Remove the paper backing. Remove your worn appliance by gently pulling away from skin and discard. Wash skin with warm water, rinse and pat dry. Inspect your skin for redness or irritation. If present, apply a small amount of powder to skin around stoma. Brush off excess powder with a Kleenex. Do not use ointments. _X__                Adapt Stoma Powder (9030)   (for wet, weepy skin)  ___                  Desenex Powder         ([employer])  (itches, rash)    Apply thin layer of Nu-Hope adhesive to skin around fistula and to back of pouch. Apply small piece of strip paste to 10 o'clock and 3 o'clock positions. Center the flange around your stoma. Be sure to flatten skin when applying pouch. Smooth the adhesive collar to your abdomen. Apply elastic barrier strips to outer edge of pouch. Empty when a 1/3 full. Care plan reviewed with patient and spouse. Patient and spouse verbalize understanding of the plan of care and contribute to goal setting.

## 2019-09-19 ENCOUNTER — HOSPITAL ENCOUNTER (OUTPATIENT)
Dept: WOUND CARE | Age: 74
Discharge: HOME | End: 2019-09-19
Payer: MEDICARE

## 2019-09-19 VITALS
SYSTOLIC BLOOD PRESSURE: 124 MMHG | HEART RATE: 92 BPM | OXYGEN SATURATION: 99 % | TEMPERATURE: 98.1 F | DIASTOLIC BLOOD PRESSURE: 66 MMHG | RESPIRATION RATE: 18 BRPM

## 2019-09-19 PROCEDURE — 99213 OFFICE O/P EST LOW 20 MIN: CPT

## 2019-09-19 PROCEDURE — 99214 OFFICE O/P EST MOD 30 MIN: CPT | Performed by: NURSE PRACTITIONER

## 2019-09-19 NOTE — PROGRESS NOTES
[address] [place] [address]  Ostomy Progress Note      NAME:  Marissa Foster  MEDICAL RECORD NUMBER:  [id number]  AGE: 66 y.o. GENDER:  female  :  1945  TODAY'S DATE:  2019    Subjective       Chief Complaint   Patient presents with    Wound Check     Abdominal fistula          HISTORY of PRESENT ILLNESS HPI    Marissa Foster is a 74 y.o. female Established patient referred by Dr. Vasquez Azar, who presents today for ostomy/stoma evaluation. 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 She has a history of appendiceal carcinoma and was treated with ileostomy and intraperitoneal chemotherapy in  at [facility]. Nga Luis Alberto continues to follow with her surgeon at [facility] is currently getting between 30-40 mL of stool from her fistula per day.    She is currently using a Kaylee deep convex urostomy pouch precut to 1-1/8 inch which has been lasting approximately 3 days. Wound/Ulcer Pain Timing/Severity: none  Quality of pain: N/A  Severity:  0 / 10   Modifying Factors: None  Associated Signs/Symptoms: none    Interval History:   Current ostomy care includes:    Desenex powder applied to the skin around the fistula, excess powder dusted off. Thin layer of Nu Hope adhesive applied to the skin around the fistula and to the back of the pouch, allowed it to dry until it became tacky. Small piece of strip paste applied to the creases at 3, 6, and 10 o'clock. Applied kaylee deep convex pouch pre cut to 1 inch. Brava elastic barrier rings applied to the outer edge of the flange.      PAST MEDICAL HISTORY        Diagnosis Date    Cancer [facility])     History of blood transfusion     7 months ago    Hyperlipidemia     Thyroid disease        PAST SURGICAL HISTORY    Past Surgical History:   Procedure Laterality Date    ABDOMEN SURGERY      APPENDECTOMY      HERNIA for coordination problems, gait problems and speech problems  Behavioral/Psych: positive for good mood    Objective    /66   Pulse 92   Temp 98.1 °F (36.7 °C) (Oral)   Resp 18   SpO2 99%     Wt Readings from Last 3 Encounters:   07/12/19 127 lb (57.6 kg)   05/31/19 127 lb 4.8 oz (57.7 kg)   04/26/19 118 lb 12.8 oz (53.9 kg)       PHYSICAL EXAM    General Appearance: alert and oriented to person, place and time  Skin: warm and dry  Head: normocephalic and atraumatic  Eyes: conjunctivae normal  ENT: hearing grossly normal bilaterally  Pulmonary/Chest: clear to auscultation bilaterally- no wheezes, rales or rhonchi, normal air movement, no respiratory distress  Cardiovascular: normal rate and regular rhythm  Abdomen: soft, non-tender and bowel sounds normal  Extremities: no cyanosis and no clubbing  Musculoskeletal: normal range of motion of extremities x 4, no joint swelling, deformity or tenderness  Neurologic: gait and coordination normal and speech normal  Mid abdomen fistula: Fistula measures approx 1.4cm x 0.5cm and is located deep in a crevice with multiple creases and scarring around the edges worse at 9 and 3 o'clock. There is irritated skin noted around the opening of the fistula extending out approx 3 cm. No warmth or induration noted.       Mid abdomen fistula    LABS       CBC:   Lab Results   Component Value Date    WBC 8.0 09/07/2018    HGB 9.2 09/07/2018    HCT 30.4 09/07/2018    MCV 84.9 09/07/2018     09/07/2018     BMP:   Lab Results   Component Value Date     09/07/2018    K 3.7 09/07/2018    K 3.6 09/05/2018     09/07/2018    CO2 24 09/07/2018    BUN 8 09/07/2018    CREATININE 0.8 09/11/2018     PT/INR: No results found for: PROTIME, INR  Prealbumin: No results found for: PREALBUMIN  Albumin:  Lab Results   Component Value Date    LABALBU 3.4 09/05/2018     Sed Rate:No results found for: SEDRATE  Micro: No results found for: [facility]     Assessment     Colocutaneous

## 2019-12-19 ENCOUNTER — HOSPITAL ENCOUNTER (OUTPATIENT)
Dept: WOUND CARE | Age: 74
Discharge: HOME | End: 2019-12-19
Payer: MEDICARE

## 2019-12-19 VITALS
WEIGHT: 127 LBS | TEMPERATURE: 97.8 F | HEART RATE: 87 BPM | SYSTOLIC BLOOD PRESSURE: 133 MMHG | OXYGEN SATURATION: 99 % | DIASTOLIC BLOOD PRESSURE: 64 MMHG | HEIGHT: 64 IN | RESPIRATION RATE: 18 BRPM | BODY MASS INDEX: 21.68 KG/M2

## 2019-12-19 PROCEDURE — 99213 OFFICE O/P EST LOW 20 MIN: CPT

## 2019-12-19 PROCEDURE — 99214 OFFICE O/P EST MOD 30 MIN: CPT | Performed by: NURSE PRACTITIONER
